# Patient Record
Sex: FEMALE | Race: WHITE | Employment: FULL TIME | URBAN - METROPOLITAN AREA
[De-identification: names, ages, dates, MRNs, and addresses within clinical notes are randomized per-mention and may not be internally consistent; named-entity substitution may affect disease eponyms.]

---

## 2018-01-15 ENCOUNTER — OFFICE VISIT (OUTPATIENT)
Dept: SURGERY | Age: 38
End: 2018-01-15

## 2018-01-15 VITALS
HEART RATE: 98 BPM | OXYGEN SATURATION: 100 % | SYSTOLIC BLOOD PRESSURE: 139 MMHG | HEIGHT: 67 IN | DIASTOLIC BLOOD PRESSURE: 95 MMHG | TEMPERATURE: 98.7 F | BODY MASS INDEX: 45.99 KG/M2 | RESPIRATION RATE: 20 BRPM | WEIGHT: 293 LBS

## 2018-01-15 DIAGNOSIS — R79.89 ELEVATED LFTS: Primary | ICD-10-CM

## 2018-01-15 DIAGNOSIS — K80.20 GALLSTONES: ICD-10-CM

## 2018-01-15 RX ORDER — PROMETHAZINE HYDROCHLORIDE 12.5 MG/1
12.5 TABLET ORAL
Qty: 20 TAB | Refills: 0 | Status: SHIPPED | OUTPATIENT
Start: 2018-01-15 | End: 2019-06-24

## 2018-01-15 NOTE — MR AVS SNAPSHOT
[address] [place] [address] 280, 5355 [facility], Suite [place] [address] [facility] 
[phone] Patient: Jessica Rollins MRN: [id number] GAP:[birth year] Visit Information Date & Time Provider Department Dept. Phone Encounter #  
 1/15/2018  2:20 PM Anita Lawson MD [facility] [place] [address] [id number] Your Appointments 2/2/2018  1:40 PM  
POST OP with Anita Lawson MD  
[facility] Dr. Jerardo Pierce [facility] ([place], [place]) ([address] [facility]) Appt Note: po- lap tesha 1/18/18  
 [address] [facility], [address] [place] [address], [address] [address]  
[address] [place] [address], [address] [place] [address], [address] [place] [address] [address] [phone] Upcoming Health Maintenance Date Due DTaP/Tdap/Td series (1 - Tdap) 3/11/1999 PAP AKA CERVICAL CYTOLOGY 3/11/1999 Influenza Age 5 to Adult 8/1/2017 Allergies as of 1/15/2018  Review Complete On: 1/15/2018 By: Anita Lawson MD  
  
 Severity Noted Reaction Type Reactions Biaxin [Clarithromycin]  05/31/2011    Nausea Only Cephalosporins  05/31/2011    Rash Levaquin [Levofloxacin]  11/05/2015    Rash Penicillin G  05/31/2011   Systemic Rash Current Immunizations  Never Reviewed No immunizations on file. Not reviewed this visit You Were Diagnosed With   
  
 Codes Comments Elevated LFTs    -  Primary ICD-10-CM: R79.89 ICD-9-CM: 790.6 Gallstones     ICD-10-CM: K80.20 ICD-9-CM: 574.20 Vitals BP Pulse Temp Resp Height(growth percentile) Weight(growth percentile) (!) 139/95 (BP 1 Location: Left arm, BP Patient Position: Sitting) 98 98.7 °F (37.1 °C) 20 5' 7\" (1.702 m) 299 lb 9.6 oz (135.9 kg) LMP SpO2 BMI OB Status Smoking Status 12/30/2017 100% 46.92 kg/m2 Needs review Former Smoker BMI and BSA Data Body Mass Index Body Surface Area  
 46.92 kg/m 2 2.53 m 2 Preferred Pharmacy Pharmacy Name Phone [place] [facility] [phone] [address] [facility], [address] AT [place] [address] [phone] Your Updated Medication List  
  
   
This list is accurate as of: 1/15/18 11:59 PM.  Always use your most recent med list.  
  
  
  
  
 CLARITIN 10 mg tablet Generic drug:  loratadine Take 5 mg by mouth daily. famotidine 20 mg tablet Commonly known as:  PEPCID Take 20 mg by mouth daily. FLUoxetine 40 mg capsule Commonly known as:  PROzac Take 60 mg by mouth nightly. ibuprofen 800 mg tablet Commonly known as:  MOTRIN Take 1 Tab by mouth every eight (8) hours. LORazepam 0.5 mg tablet Commonly known as:  ATIVAN Take 0.5 mg by mouth as needed. losartan 25 mg tablet Commonly known as:  COZAAR Take 50 mg by mouth nightly. promethazine 12.5 mg tablet Commonly known as:  PHENERGAN Take 1 Tab by mouth every six (6) hours as needed for Nausea. Prescriptions Sent to Pharmacy Refills  
 promethazine (PHENERGAN) 12.5 mg tablet 0 Sig: Take 1 Tab by mouth every six (6) hours as needed for Nausea. Class: Normal  
 Pharmacy: [facility] [address] [place] [address] [address] [facility] Ph #: [phone] Route: Oral  
  
We Performed the Following LIPASE N8452511 CPT(R)] METABOLIC PANEL, COMPREHENSIVE [58883 CPT(R)] To-Do List   
 01/15/2018 Imaging:  CT ABD PELV W CONT   
  
 01/16/2018 2:30 PM  
  Appointment with [address] [address] [facility] 2 at [facility] CT ([phone]) CONTRAST STUDY: 1. The patient should not eat solid food four hours before the appointment but should be encouraged to drink clear liquids. 2.  If you have to drink oral contrast, please pick it up any weekday prior to your appointment, if you cannot please check in 2 hrs before appt time. 3.  The patient will require IV access for contrast administration.  4. The patient should not take Ibuprofen (Advil, Motrin, etc.) and Naproxen Sodium (Aleve, etc.)  on the day of the exam. Stopping non-steroidal anti-inflammatory agents (NSAIDs) like Ibuprofen decreases the risk of kidney damage from the x-ray contrast (dye). 5.  [facility] films/images pertaining to the area of interest with you on the day of appointment. 6.  Bring current lab work if available (within last 90 days CMP) ***If scheduled at [facility], iSTAT is not available, labs will need to be done before appointment*** 7. Check in at registration at least 30 minutes before appt time unless you were instructed to do otherwise. Introducing [facility]! Juventino Thomas introduces [employer] patient portal. Now you can access parts of your medical record, email your doctor's office, and request medication refills online. 1. In your internet browser, go to [email or web address] 2. Click on the First Time User? Click Here link in the Sign In box. You will see the New Member Sign Up page. 3. Enter your [employer] Access Code exactly as it appears below. You will not need to use this code after youve completed the sign-up process. If you do not sign up before the expiration date, you must request a new code. · [employer] Access Code: [email or web address] Expires: 4/15/2018  2:25 PM 
 
4. Enter the last four digits of your Social Security Number (xxxx) and Date of Birth (mm/dd/yyyy) as indicated and click Submit. You will be taken to the next sign-up page. 5. Create a TextureMediat ID. This will be your [employer] login ID and cannot be changed, so think of one that is secure and easy to remember. 6. Create a [employer] password. You can change your password at any time. 7. Enter your Password Reset Question and Answer. This can be used at a later time if you forget your password. 8. Enter your e-mail address.  You will receive e-mail notification when new information is available in Neohapsis. 9. Click Sign Up. You can now view and download portions of your medical record. 10. Click the Download Summary menu link to download a portable copy of your medical information. If you have questions, please visit the Frequently Asked Questions section of the [employer] website. Remember, [employer] is NOT to be used for urgent needs. For medical emergencies, dial 911. Now available from your iPhone and Android! Please provide this summary of care documentation to your next provider. Your primary care clinician is listed as Adelbert Meckel. If you have any questions after today's visit, please call [phone].

## 2018-01-15 NOTE — PROGRESS NOTES
Surgery Consult:  Gallstone  Requesting physician:  [facility]    Subjective:   Patient 42 y.o.  female was seen at [facility] on 1/17/18 with 1 day history of intractable nausea, vomiting and achy igastric pain radiating to the RUQ and back. No F/C/S. No change in bowel habits. No diarrhea or constipation. Labs done at urgent care was remarkable for elevated LFTs. TB was 4.0, , , and . Patient subsequently underwent US on 1/9/18 which showed gallstone without ductal dilation. Patient does have history of biliary colic for few years. No history of pancreatitis, but  reports that his wife looks jaundice currently. Patient has been taking Excedrine almost daily for few months for headache. Patient reports that her N/V and abdominal pain is little better today. Of note, patient's mom is in hospice for most likely metastatic cholangiocarcinoma. Past Medical & Surgical History:  Past Medical History:   Diagnosis Date    Asthma     Heart abnormalities     PVCs      Past Surgical History:   Procedure Laterality Date    HX OTHER SURGICAL      tonsilectomy & adnoidectomy    HX OTHER SURGICAL      Vaginal delivery       Social History:  Social History     Social History    Marital status:      Spouse name: N/A    Number of children: N/A    Years of education: N/A     Occupational History    Not on file.      Social History Main Topics    Smoking status: Former Smoker    Smokeless tobacco: Never Used    Alcohol use No    Drug use: No    Sexual activity: Yes     Partners: Male     Birth control/ protection: Pill     Other Topics Concern    Not on file     Social History Narrative        Family History:  Family History   Problem Relation Age of Onset    Hypertension Mother     Hypertension Father     Cancer Father     Cancer Maternal Grandfather     Heart Disease Paternal Grandmother         Medications:  Current Outpatient Prescriptions   Medication Sig    promethazine (PHENERGAN) 12.5 mg tablet Take 1 Tab by mouth every six (6) hours as needed for Nausea.  FLUoxetine (PROZAC) 40 mg capsule Take 40 mg by mouth daily.  famotidine (PEPCID) 20 mg tablet Take 20 mg by mouth two (2) times a day.  losartan (COZAAR) 25 mg tablet Take 25 mg by mouth daily.  ibuprofen (MOTRIN) 800 mg tablet Take 1 Tab by mouth every eight (8) hours.  loratadine (CLARITIN) 10 mg tablet Take 5 mg by mouth daily. No current facility-administered medications for this visit. Allergies: Allergies   Allergen Reactions    Biaxin [Clarithromycin] Nausea Only    Cephalosporins Rash    Levaquin [Levofloxacin] Rash    Penicillin G Rash       Review of Systems  A comprehensive review of systems was negative except for that written in the HPI. Objective:     Exam:    Visit Vitals    BP (!) 139/95 (BP 1 Location: Left arm, BP Patient Position: Sitting)    Pulse 98    Temp 98.7 °F (37.1 °C)    Resp 20    Ht 5' 7\" (1.702 m)    Wt 135.9 kg (299 lb 9.6 oz)    SpO2 100%    BMI 46.92 kg/m2     General appearance: alert, cooperative, no distress, appears stated age  Eyes: sclera icterus  Lungs: clear to auscultation bilaterally  Heart: regular rate and rhythm  Abdomen: soft, non-distended. Mild epigastric and RUQ tenderness. No rebound or guarding. Extremities: extremities normal, atraumatic, no cyanosis or edema. JORGE. Skin: Skin color, texture, turgor normal. No rashes or lesions. Jaundice. Neurologic: Grossly normal      Assessment:     Gallstone  Elevated LFTs    Plan:     Repeat LFTs  Abd/pelvic CT scan to r/o any mass effect  Laparoscopic cholecystectomy/IOC. Risks, benefit, and alternative to surgery was discussed with the patient. The risks of surgery include but not limited to infection, bleeding, intraabdominal organ injury, bile duct injury, retained stone, bile leak, possible conversion to open, and the risks of general anesthetic.  Patient is agreeable to surgery. All questions answered.

## 2018-01-15 NOTE — PROGRESS NOTES
1. Have you been to the ER, urgent care clinic since your last visit? Hospitalized since your last visit? No    2. Have you seen or consulted any other health care providers outside of the [address] [place] [address] since your last visit? Include any pap smears or colon screening.  No

## 2018-01-15 NOTE — LETTER
NOTIFICATION OF RETURN TO WORK  
 
 
 
 
 
1/15/2018 3:56 PM 
 
Ms. Chiquita Cool [address] [address] To Whom It May Concern: 
 
 
Chiquita Paiz was under the care of [address] from 01/15/2018 to 02/05/2018. She will be able to return to work on 02/05/2018 with regular duties and/or activities . If there are questions or concerns please have the patient contact our office. Sincerely, Faina Acosta MD

## 2018-01-16 ENCOUNTER — HOSPITAL ENCOUNTER (OUTPATIENT)
Dept: CT IMAGING | Age: 38
Discharge: HOME | End: 2018-01-16
Attending: SURGERY
Payer: COMMERCIAL

## 2018-01-16 DIAGNOSIS — R79.89 ELEVATED LFTS: ICD-10-CM

## 2018-01-16 PROCEDURE — 74011636320 HC RX REV CODE- 636/320: Performed by: SURGERY

## 2018-01-16 PROCEDURE — 74177 CT ABD & PELVIS W/CONTRAST: CPT

## 2018-01-16 PROCEDURE — 74011250636 HC RX REV CODE- 250/636: Performed by: SURGERY

## 2018-01-16 PROCEDURE — 74178 CT ABD&PLV WO CNTR FLWD CNTR: CPT

## 2018-01-16 RX ORDER — SODIUM CHLORIDE 0.9 % (FLUSH) 0.9 %
10 SYRINGE (ML) INJECTION
Status: COMPLETED | OUTPATIENT
Start: 2018-01-16 | End: 2018-01-16

## 2018-01-16 RX ORDER — SODIUM CHLORIDE 9 MG/ML
50 INJECTION, SOLUTION INTRAVENOUS
Status: COMPLETED | OUTPATIENT
Start: 2018-01-16 | End: 2018-01-16

## 2018-01-16 RX ADMIN — IOPAMIDOL 100 ML: 755 INJECTION, SOLUTION INTRAVENOUS at 15:51

## 2018-01-16 RX ADMIN — SODIUM CHLORIDE 50 ML/HR: 900 INJECTION, SOLUTION INTRAVENOUS at 15:51

## 2018-01-16 RX ADMIN — Medication 10 ML: at 15:51

## 2018-01-17 ENCOUNTER — TELEPHONE (OUTPATIENT)
Dept: SURGERY | Age: 38
End: 2018-01-17

## 2018-01-17 ENCOUNTER — HOSPITAL ENCOUNTER (INPATIENT)
Age: 38
LOS: 6 days | Discharge: HOME HEALTH | DRG: 412 | End: 2018-01-23
Attending: SURGERY | Admitting: SURGERY
Payer: COMMERCIAL

## 2018-01-17 DIAGNOSIS — K80.50 COMMON BILE DUCT STONE: ICD-10-CM

## 2018-01-17 DIAGNOSIS — R17 JAUNDICE: Primary | ICD-10-CM

## 2018-01-17 LAB
ALBUMIN SERPL-MCNC: 2.3 G/DL (ref 3.5–5)
ALBUMIN SERPL-MCNC: 3.2 G/DL (ref 3.5–5.5)
ALBUMIN/GLOB SERPL: 0.5 {RATIO} (ref 1.1–2.2)
ALBUMIN/GLOB SERPL: 1 {RATIO} (ref 1.2–2.2)
ALP SERPL-CCNC: 306 IU/L (ref 39–117)
ALP SERPL-CCNC: 328 U/L (ref 45–117)
ALT SERPL-CCNC: 102 U/L (ref 12–78)
ALT SERPL-CCNC: 104 IU/L (ref 0–32)
ANION GAP SERPL CALC-SCNC: 9 MMOL/L (ref 5–15)
APTT PPP: 30.3 SEC (ref 22.1–32.5)
AST SERPL-CCNC: 82 IU/L (ref 0–40)
AST SERPL-CCNC: 90 U/L (ref 15–37)
ATRIAL RATE: 78 BPM
BASOPHILS # BLD: 0 K/UL (ref 0–0.1)
BASOPHILS NFR BLD: 0 % (ref 0–1)
BILIRUB DIRECT SERPL-MCNC: 7.2 MG/DL (ref 0–0.2)
BILIRUB SERPL-MCNC: 7.9 MG/DL (ref 0–1.2)
BILIRUB SERPL-MCNC: 9.1 MG/DL (ref 0.2–1)
BUN SERPL-MCNC: 6 MG/DL (ref 6–20)
BUN SERPL-MCNC: 7 MG/DL (ref 6–20)
BUN/CREAT SERPL: 13 (ref 9–23)
BUN/CREAT SERPL: 15 (ref 12–20)
CALCIUM SERPL-MCNC: 8.5 MG/DL (ref 8.5–10.1)
CALCIUM SERPL-MCNC: 8.8 MG/DL (ref 8.7–10.2)
CALCULATED P AXIS, ECG09: 17 DEGREES
CALCULATED R AXIS, ECG10: 7 DEGREES
CALCULATED T AXIS, ECG11: 34 DEGREES
CHLORIDE SERPL-SCNC: 100 MMOL/L (ref 97–108)
CHLORIDE SERPL-SCNC: 95 MMOL/L (ref 96–106)
CO2 SERPL-SCNC: 27 MMOL/L (ref 21–32)
CO2 SERPL-SCNC: 28 MMOL/L (ref 18–29)
CREAT SERPL-MCNC: 0.47 MG/DL (ref 0.55–1.02)
CREAT SERPL-MCNC: 0.47 MG/DL (ref 0.57–1)
DIAGNOSIS, 93000: NORMAL
EOSINOPHIL # BLD: 0.1 K/UL (ref 0–0.4)
EOSINOPHIL NFR BLD: 1 % (ref 0–7)
ERYTHROCYTE [DISTWIDTH] IN BLOOD BY AUTOMATED COUNT: 16.5 % (ref 11.5–14.5)
GLOBULIN SER CALC-MCNC: 3.3 G/DL (ref 1.5–4.5)
GLOBULIN SER CALC-MCNC: 4.3 G/DL (ref 2–4)
GLUCOSE SERPL-MCNC: 108 MG/DL (ref 65–100)
GLUCOSE SERPL-MCNC: 94 MG/DL (ref 65–99)
HCT VFR BLD AUTO: 30.9 % (ref 35–47)
HGB BLD-MCNC: 10 G/DL (ref 11.5–16)
INR PPP: 1.2 (ref 0.9–1.1)
LIPASE SERPL-CCNC: 11 U/L (ref 14–72)
LYMPHOCYTES # BLD: 0.9 K/UL (ref 0.8–3.5)
LYMPHOCYTES NFR BLD: 9 % (ref 12–49)
MCH RBC QN AUTO: 24.9 PG (ref 26–34)
MCHC RBC AUTO-ENTMCNC: 32.4 G/DL (ref 30–36.5)
MCV RBC AUTO: 77.1 FL (ref 80–99)
MONOCYTES # BLD: 1 K/UL (ref 0–1)
MONOCYTES NFR BLD: 10 % (ref 5–13)
NEUTS SEG # BLD: 8.9 K/UL (ref 1.8–8)
NEUTS SEG NFR BLD: 80 % (ref 32–75)
P-R INTERVAL, ECG05: 192 MS
PLATELET # BLD AUTO: 379 K/UL (ref 150–400)
POTASSIUM SERPL-SCNC: 3.1 MMOL/L (ref 3.5–5.1)
POTASSIUM SERPL-SCNC: 3.8 MMOL/L (ref 3.5–5.2)
PROT SERPL-MCNC: 6.5 G/DL (ref 6–8.5)
PROT SERPL-MCNC: 6.6 G/DL (ref 6.4–8.2)
PROTHROMBIN TIME: 11.9 SEC (ref 9–11.1)
Q-T INTERVAL, ECG07: 390 MS
QRS DURATION, ECG06: 92 MS
QTC CALCULATION (BEZET), ECG08: 444 MS
RBC # BLD AUTO: 4.01 M/UL (ref 3.8–5.2)
SODIUM SERPL-SCNC: 136 MMOL/L (ref 136–145)
SODIUM SERPL-SCNC: 138 MMOL/L (ref 134–144)
THERAPEUTIC RANGE,PTTT: NORMAL SECS (ref 58–77)
VENTRICULAR RATE, ECG03: 78 BPM
WBC # BLD AUTO: 10.9 K/UL (ref 3.6–11)

## 2018-01-17 PROCEDURE — 85730 THROMBOPLASTIN TIME PARTIAL: CPT | Performed by: SPECIALIST

## 2018-01-17 PROCEDURE — 85025 COMPLETE CBC W/AUTO DIFF WBC: CPT | Performed by: SPECIALIST

## 2018-01-17 PROCEDURE — 65270000015 HC RM PRIVATE ONCOLOGY

## 2018-01-17 PROCEDURE — 80076 HEPATIC FUNCTION PANEL: CPT | Performed by: SPECIALIST

## 2018-01-17 PROCEDURE — 80048 BASIC METABOLIC PNL TOTAL CA: CPT | Performed by: SPECIALIST

## 2018-01-17 PROCEDURE — 85610 PROTHROMBIN TIME: CPT | Performed by: SPECIALIST

## 2018-01-17 PROCEDURE — 74011250636 HC RX REV CODE- 250/636: Performed by: SURGERY

## 2018-01-17 PROCEDURE — 36415 COLL VENOUS BLD VENIPUNCTURE: CPT | Performed by: SPECIALIST

## 2018-01-17 PROCEDURE — 93005 ELECTROCARDIOGRAM TRACING: CPT

## 2018-01-17 PROCEDURE — 74011250637 HC RX REV CODE- 250/637: Performed by: SURGERY

## 2018-01-17 RX ORDER — CLINDAMYCIN PHOSPHATE 900 MG/50ML
900 INJECTION INTRAVENOUS
Status: DISCONTINUED | OUTPATIENT
Start: 2018-01-17 | End: 2018-01-17

## 2018-01-17 RX ORDER — SODIUM CHLORIDE, SODIUM LACTATE, POTASSIUM CHLORIDE, CALCIUM CHLORIDE 600; 310; 30; 20 MG/100ML; MG/100ML; MG/100ML; MG/100ML
25 INJECTION, SOLUTION INTRAVENOUS CONTINUOUS
Status: DISCONTINUED | OUTPATIENT
Start: 2018-01-18 | End: 2018-01-21

## 2018-01-17 RX ORDER — CLINDAMYCIN PHOSPHATE 600 MG/50ML
600 INJECTION INTRAVENOUS EVERY 8 HOURS
Status: DISCONTINUED | OUTPATIENT
Start: 2018-01-17 | End: 2018-01-19

## 2018-01-17 RX ORDER — HYDROMORPHONE HYDROCHLORIDE 1 MG/ML
0.5 INJECTION, SOLUTION INTRAMUSCULAR; INTRAVENOUS; SUBCUTANEOUS
Status: DISCONTINUED | OUTPATIENT
Start: 2018-01-17 | End: 2018-01-17

## 2018-01-17 RX ORDER — DEXTROSE, SODIUM CHLORIDE, AND POTASSIUM CHLORIDE 5; .45; .15 G/100ML; G/100ML; G/100ML
25 INJECTION INTRAVENOUS CONTINUOUS
Status: DISCONTINUED | OUTPATIENT
Start: 2018-01-17 | End: 2018-01-23 | Stop reason: HOSPADM

## 2018-01-17 RX ORDER — CLINDAMYCIN PHOSPHATE 600 MG/50ML
600 INJECTION INTRAVENOUS EVERY 8 HOURS
Status: DISCONTINUED | OUTPATIENT
Start: 2018-01-17 | End: 2018-01-17

## 2018-01-17 RX ORDER — SODIUM CHLORIDE 0.9 % (FLUSH) 0.9 %
5-10 SYRINGE (ML) INJECTION AS NEEDED
Status: DISCONTINUED | OUTPATIENT
Start: 2018-01-17 | End: 2018-01-23 | Stop reason: HOSPADM

## 2018-01-17 RX ORDER — SODIUM CHLORIDE, SODIUM LACTATE, POTASSIUM CHLORIDE, CALCIUM CHLORIDE 600; 310; 30; 20 MG/100ML; MG/100ML; MG/100ML; MG/100ML
25 INJECTION, SOLUTION INTRAVENOUS CONTINUOUS
Status: DISCONTINUED | OUTPATIENT
Start: 2018-01-18 | End: 2018-01-18 | Stop reason: HOSPADM

## 2018-01-17 RX ORDER — SODIUM CHLORIDE 0.9 % (FLUSH) 0.9 %
5-10 SYRINGE (ML) INJECTION EVERY 8 HOURS
Status: DISCONTINUED | OUTPATIENT
Start: 2018-01-17 | End: 2018-01-23 | Stop reason: HOSPADM

## 2018-01-17 RX ORDER — CLINDAMYCIN PHOSPHATE 600 MG/50ML
600 INJECTION INTRAVENOUS EVERY 8 HOURS
Status: DISCONTINUED | OUTPATIENT
Start: 2018-01-17 | End: 2018-01-17 | Stop reason: CLARIF

## 2018-01-17 RX ORDER — ONDANSETRON 2 MG/ML
4 INJECTION INTRAMUSCULAR; INTRAVENOUS
Status: DISCONTINUED | OUTPATIENT
Start: 2018-01-17 | End: 2018-01-23 | Stop reason: HOSPADM

## 2018-01-17 RX ORDER — HYDROMORPHONE HYDROCHLORIDE 2 MG/ML
0.5 INJECTION, SOLUTION INTRAMUSCULAR; INTRAVENOUS; SUBCUTANEOUS
Status: DISCONTINUED | OUTPATIENT
Start: 2018-01-17 | End: 2018-01-18

## 2018-01-17 RX ORDER — LOSARTAN POTASSIUM 50 MG/1
50 TABLET ORAL DAILY
Status: DISCONTINUED | OUTPATIENT
Start: 2018-01-17 | End: 2018-01-23 | Stop reason: HOSPADM

## 2018-01-17 RX ORDER — FLUOXETINE HYDROCHLORIDE 20 MG/1
60 CAPSULE ORAL DAILY
Status: DISCONTINUED | OUTPATIENT
Start: 2018-01-17 | End: 2018-01-23 | Stop reason: HOSPADM

## 2018-01-17 RX ADMIN — FLUOXETINE 60 MG: 20 CAPSULE ORAL at 21:35

## 2018-01-17 RX ADMIN — Medication 10 ML: at 21:36

## 2018-01-17 RX ADMIN — DEXTROSE MONOHYDRATE, SODIUM CHLORIDE, AND POTASSIUM CHLORIDE 125 ML/HR: 50; 4.5; 1.49 INJECTION, SOLUTION INTRAVENOUS at 19:11

## 2018-01-17 RX ADMIN — ONDANSETRON HYDROCHLORIDE 4 MG: 2 INJECTION, SOLUTION INTRAMUSCULAR; INTRAVENOUS at 22:14

## 2018-01-17 RX ADMIN — DEXTROSE MONOHYDRATE, SODIUM CHLORIDE, AND POTASSIUM CHLORIDE 125 ML/HR: 50; 4.5; 1.49 INJECTION, SOLUTION INTRAVENOUS at 11:27

## 2018-01-17 RX ADMIN — CLINDAMYCIN PHOSPHATE 600 MG: 12 INJECTION, SOLUTION INTRAVENOUS at 13:29

## 2018-01-17 RX ADMIN — CLINDAMYCIN PHOSPHATE 600 MG: 12 INJECTION, SOLUTION INTRAVENOUS at 21:35

## 2018-01-17 RX ADMIN — HYDROMORPHONE HYDROCHLORIDE 0.5 MG: 2 INJECTION INTRAMUSCULAR; INTRAVENOUS; SUBCUTANEOUS at 13:29

## 2018-01-17 RX ADMIN — LOSARTAN POTASSIUM 50 MG: 50 TABLET, FILM COATED ORAL at 22:18

## 2018-01-17 NOTE — IP AVS SNAPSHOT
[address] [facility] 
[phone] Patient: Elena Galeana MRN: [id number] YXR:0/84/7574 About your hospitalization You were admitted on:  January 17, 2018 You last received care in the:  [facility] 2 GENERAL SURGERY You were discharged on:  January 23, 2018 Why you were hospitalized Your primary diagnosis was:  Not on File Your diagnoses also included:  Jaundice, Common Bile Duct Stone Follow-up Information Follow up With Details Comments Contact Info Georgie Fam MD   [address] [place] [address] [address] [phone] Your Scheduled Appointments Friday February 02, 2018  1:40 PM EST  
POST OP with Sarah Landa MD  
[facility] Dr[address] Jerardo Pierce [facility] ([address]) [address] [place] [address], [address] [facility]  
[phone] Discharge Orders None A check nancy indicates which time of day the medication should be taken. My Medications START taking these medications Instructions Each Dose to Equal  
 Morning Noon Evening Bedtime  
 traMADol 50 mg tablet Commonly known as:  ULTRAM  
   
Your last dose was: Your next dose is: Take 1-2 Tabs by mouth every six (6) hours as needed for Pain. Max Daily Amount: 400 mg.  
  mg CONTINUE taking these medications Instructions Each Dose to Equal  
 Morning Noon Evening Bedtime CLARITIN 10 mg tablet Generic drug:  loratadine Your last dose was: Your next dose is: Take 5 mg by mouth daily. 5 mg  
    
   
   
   
  
 famotidine 20 mg tablet Commonly known as:  PEPCID Your last dose was: Your next dose is: Take 20 mg by mouth daily. 20 mg FLUoxetine 40 mg capsule Commonly known as:  PROzac Your last dose was: Your next dose is: Take 60 mg by mouth nightly. 60 mg  
    
   
   
   
  
 ibuprofen 800 mg tablet Commonly known as:  MOTRIN Your last dose was: Your next dose is: Take 1 Tab by mouth every eight (8) hours. 800 mg LORazepam 0.5 mg tablet Commonly known as:  ATIVAN Your last dose was: Your next dose is: Take 0.5 mg by mouth as needed. 0.5 mg  
    
   
   
   
  
 losartan 25 mg tablet Commonly known as:  COZAAR Your last dose was: Your next dose is: Take 50 mg by mouth nightly. 50 mg  
    
   
   
   
  
 promethazine 12.5 mg tablet Commonly known as:  PHENERGAN Your last dose was: Your next dose is: Take 1 Tab by mouth every six (6) hours as needed for Nausea. 12.5 mg Where to Get Your Medications Information on where to get these meds will be given to you by the nurse or doctor. ! Ask your nurse or doctor about these medications  
  traMADol 50 mg tablet Discharge Instructions Patient Discharge Instructions Alcides Hazel / [id number] : 1980 Admitted 2018 Discharged: [facility] Recommended diet: Low fat, Low cholesterol Recommended activity: no heavy lifting > 20 lbs x 6 weeks; no driving while taking narcotics for pain. May take shower, but no bath x 2 weeks. Empty and keep record of T-tube and BRANDI output daily. If you experience a lot of drainage, develop redness around the wound, or a fever over 101 F occurs please call the office. Narcotics and anesthesia sometimes cause nausea and vomiting. If persistent please call the office. Do not hesitate to call with questions or concerns. Follow-up with Dr. Julia Aguilera in 1 week. Please call [phone] for an appointment.  
 
 
 
Information obtained by : 
 I understand that if any problems occur once I am at home I am to contact my physician. I understand and acknowledge receipt of the instructions indicated above. Physician's or R.N.'s Signature                                                                  Date/Time Patient or Representative Signature                                                          Date/Time Introducing [facility]! Dimitris Garibay introduces "[employer] patient portal. Now you can access parts of your medical record, email your doctor's office, and request medication refills online. 1. In your internet browser, go to [email or web address] 2. Click on the First Time User? Click Here link in the Sign In box. You will see the New Member Sign Up page. 3. Enter your "[employer] Access Code exactly as it appears below. You will not need to use this code after youve completed the sign-up process. If you do not sign up before the expiration date, you must request a new code. · "[employer] Access Code: [email or web address] Expires: 4/15/2018  2:25 PM 
 
4. Enter the last four digits of your Social Security Number (xxxx) and Date of Birth (mm/dd/yyyy) as indicated and click Submit. You will be taken to the next sign-up page. 5. Create a "[employer] ID. This will be your "[employer] login ID and cannot be changed, so think of one that is secure and easy to remember. 6. Create a "[employer] password. You can change your password at any time. 7. Enter your Password Reset Question and Answer. This can be used at a later time if you forget your password. 8. Enter your e-mail address.  You will receive e-mail notification when new information is available in en-Gauge. 9. Click Sign Up. You can now view and download portions of your medical record. 10. Click the Download Summary menu link to download a portable copy of your medical information. If you have questions, please visit the Frequently Asked Questions section of the [employer] website. Remember, [employer] is NOT to be used for urgent needs. For medical emergencies, dial 911. Now available from your iPhone and Android! Providers Seen During Your Hospitalization Provider Specialty Primary office phone Estelle Hoang MD General Surgery [phone] Your Primary Care Physician (PCP) Primary Care Physician Office Phone Office Fax Brody Puma, [address] [phone] You are allergic to the following Allergen Reactions Biaxin (Clarithromycin) Nausea Only Cephalosporins Rash Dilaudid (Hydromorphone) Shortness of Breath Extremely lethargic, Flushed Levaquin (Levofloxacin) Rash Penicillin G Rash Recent Documentation Height Weight Breastfeeding? BMI OB Status Smoking Status 1.702 m 135.6 kg No 46.83 kg/m2 Having regular periods Former Smoker Emergency Contacts Name Discharge Info Relation Home Work Mobile Italo Toussaint DISCHARGE CAREGIVER [3] Spouse [3]   [phone] Patient Belongings The following personal items are in your possession at time of discharge: 
  Dental Appliances: None  Visual Aid: None   Hearing Aids/Status: Does not own  Home Medications: None   Jewelry: None  Clothing: None    Other Valuables: None  Personal Items Sent to Safe: n/a Please provide this summary of care documentation to your next provider. Signatures-by signing, you are acknowledging that this After Visit Summary has been reviewed with you and you have received a copy.   
  
 
  
    
    
 Patient Signature: ____________________________________________________________ Date:  ____________________________________________________________  
  
Sharlyne Fernandez Provider Signature:  ____________________________________________________________ Date:  ____________________________________________________________

## 2018-01-17 NOTE — TELEPHONE ENCOUNTER
Spoke with Ms Quinten Cruz. Pt to be admitted today, she should arrive at [address] [place] [address] Dr [address], outpatient registration.

## 2018-01-17 NOTE — H&P
Surgery Consult:  Gallstone  Requesting physician:  [facility]     Subjective:   Patient 42 y.o.  female was seen at [facility] on 1/17/18 with 1 day history of intractable nausea, vomiting and achy igastric pain radiating to the RUQ and back. No F/C/S. No change in bowel habits. No diarrhea or constipation. Labs done at urgent care was remarkable for elevated LFTs. TB was 4.0, , , and . Patient subsequently underwent US on 1/9/18 which showed gallstone without ductal dilation. Patient does have history of biliary colic for few years. No history of pancreatitis, but  reports that his wife looks jaundice currently. Patient has been taking Excedrine almost daily for few months for headache. Patient reports that her N/V and abdominal pain is little better today. Of note, patient's mom is in hospice for most likely metastatic cholangiocarcinoma. Patient was seen in my clinic on 1/15/17 and ordered f/u LFTs and CT scan. LFTs showed TB 7.9, ASLP 306.   CT scan showed cholelithiasis with CBD stone and mild intrahepatic biliary dilation; enlarged celiac axis node.          Past Medical & Surgical History:       Past Medical History:   Diagnosis Date    Asthma      Heart abnormalities       PVCs            Past Surgical History:   Procedure Laterality Date    HX OTHER SURGICAL         tonsilectomy & adnoidectomy    HX OTHER SURGICAL         Vaginal delivery         Social History:  Social History            Social History    Marital status:        Spouse name: N/A    Number of children: N/A    Years of education: N/A          Occupational History    Not on file.            Social History Main Topics    Smoking status: Former Smoker    Smokeless tobacco: Never Used    Alcohol use No    Drug use: No    Sexual activity: Yes       Partners: Male       Birth control/ protection: Pill           Other Topics Concern    Not on file      Social History Narrative    Family History:        Family History   Problem Relation Age of Onset    Hypertension Mother      Hypertension Father      Cancer Father      Cancer Maternal Grandfather      Heart Disease Paternal Grandmother           Medications:       Current Outpatient Prescriptions   Medication Sig    promethazine (PHENERGAN) 12.5 mg tablet Take 1 Tab by mouth every six (6) hours as needed for Nausea.  FLUoxetine (PROZAC) 40 mg capsule Take 40 mg by mouth daily.  famotidine (PEPCID) 20 mg tablet Take 20 mg by mouth two (2) times a day.  losartan (COZAAR) 25 mg tablet Take 25 mg by mouth daily.  ibuprofen (MOTRIN) 800 mg tablet Take 1 Tab by mouth every eight (8) hours.  loratadine (CLARITIN) 10 mg tablet Take 5 mg by mouth daily.      No current facility-administered medications for this visit.          Allergies: Allergies   Allergen Reactions    Biaxin [Clarithromycin] Nausea Only    Cephalosporins Rash    Levaquin [Levofloxacin] Rash    Penicillin G Rash         Review of Systems  A comprehensive review of systems was negative except for that written in the HPI.     Objective:      Exam:          Visit Vitals    BP (!) 139/95 (BP 1 Location: Left arm, BP Patient Position: Sitting)    Pulse 98    Temp 98.7 °F (37.1 °C)    Resp 20    Ht 5' 7\" (1.702 m)    Wt 135.9 kg (299 lb 9.6 oz)    SpO2 100%    BMI 46.92 kg/m2      General appearance: alert, cooperative, no distress, appears stated age  Eyes: sclera icterus  Lungs: clear to auscultation bilaterally  Heart: regular rate and rhythm  Abdomen: soft, non-distended. Mild epigastric and RUQ tenderness. No rebound or guarding. Extremities: extremities normal, atraumatic, no cyanosis or edema. JORGE. Skin: Skin color, texture, turgor normal. No rashes or lesions. Jaundice.   Neurologic: Grossly normal        Assessment:      Gallstone  CBD stone  Elevated LFTs     Plan:      Direct admit for worsening TB and concern for possible cholangitis. GI consult for ERCP  Plan for ERCP and laparoscopic cholecystectomy/IOC tomorrow. Risks, benefit, and alternative to surgery was discussed with the patient. The risks of surgery include but not limited to infection, bleeding, intraabdominal organ injury, bile duct injury, retained stone, bile leak, possible conversion to open, and the risks of general anesthetic. Patient is agreeable to surgery. All questions answered.   Antibiotics

## 2018-01-17 NOTE — PROGRESS NOTES
Pharmacy Automatic Renal Dosing Protocol - Antimicrobials    Indication for Antimicrobials: Cholelithiasis    Current Regimen of Each Antimicrobial:  Clindamycin 600 mg IV q8H (Start Date ; Day # 1)    Previous Antimicrobial Therapy:    Significant Cultures:    CT ABD: Cholelithiasis with common bile duct stone and mild intrahepatic biliary  dilatation  Enlarged celiac axis node, significance unclear. Additional shotty nodes as  described. Incidental descending colon diverticulosis. Paralysis, amputations, malnutrition: N/A    Labs:  Recent Labs      18   1223   CREA  0.47*   BUN  6     Temp (24hrs), Av °F (36.7 °C), Min:98 °F (36.7 °C), Max:98 °F (36.7 °C)      Creatinine Clearance (mL/min) or Dialysis: 104      Impression/Plan: Starting clindamycin 600 mg IV q8h is appropriate. No renal adjustment needed     Pharmacy will follow daily and adjust medications as appropriate for renal function and/or serum levels. Thank you,  Leslie Jaramillo          Recommended duration of therapy  [email or web address]/[facility]/Pharmacy/[email or web address]. docx    Renal Dosing  [email or web address]/[facility]/Pharmacy/Clinical%20Companion/Renal%[email or web address]. pdf

## 2018-01-17 NOTE — PROGRESS NOTES
Oncology Nursing Communication Tool  4:03 PM  1/17/2018     Bedside shift change report given to Jhon Dixon RN (incoming nurse) by Kelle Hunt RN (outgoing nurse[facility] Report included the following information SBAR. Shift Summary:       Issues for physician to address: Oncology Shift Note   Admission Date 1/17/2018   Admission Diagnosis Gallstone/IV ABX  Jaundice   Code Status Full Code   Consults IP CONSULT TO GASTROENTEROLOGY      Cardiac Monitoring [] Yes [x] No      Purposeful Hourly Rounding [x] Yes    Marcin Score Total Score: 1   Marcin score 3 or > [] Bed Alarm [] Avasys [] 1:1 sitter [] Patient refused (Place signed refusal form in chart)      Pain Managed [] Yes [] No    Key Pain Meds             ibuprofen (MOTRIN) 800 mg tablet Take 1 Tab by mouth every eight (8) hours. Influenza Vaccine Received Flu Vaccine for Current Season (usually Sept-March): Yes           Oxygen needs? [x] Room air Oxygen @  []1L    []2L    []3L   []4L    []5L   []6L     Use home O2? [] Yes [] No  Perform O2 challenge test using  smartphrase (.oxygenchallenge)      Last bowel movement    bowel movement      Urinary Catheter             LDAs               Peripheral IV 01/17/18 Right Antecubital (Active)   Site Assessment Clean, dry, & intact 1/17/2018 11:39 AM   Phlebitis Assessment 0 1/17/2018 11:39 AM   Infiltration Assessment 0 1/17/2018 11:39 AM   Dressing Status Clean, dry, & intact 1/17/2018 11:39 AM   Dressing Type Transparent 1/17/2018 11:39 AM   Hub Color/Line Status Blue; Infusing 1/17/2018 11:39 AM                         Readmission Risk Assessment Tool Score Low Risk            3       Total Score        3 Has Seen PCP in Last 6 Months (Yes=3, No=0)        Criteria that do not apply:    . Living with Significant Other. Assisted Living. LTAC. SNF. or   Rehab    Patient Length of Stay (>5 days = 3)    IP Visits Last 12 Months (1-3=4, 4=9, >4=11)    Pt.  Coverage (Medicare=5 , Medicaid, or Self-Pay=4)    Charlson Comorbidity Score (Age + Comorbid Conditions)       Expected Length of Stay - - -   Actual Length of Stay 0          Rut Bolanos, RN

## 2018-01-17 NOTE — CONSULTS
Gastroenterology Consultation Note  Dr. Dalila Handy    NAME: Aleena Loco : 1980 MRN: [id number]   PCP: Ever Modi MD  Date/Time:  2018 2:57 PM  Subjective:   REASON FOR CONSULT:      Aleena Loco is a 42 y.o.  female who I was asked to see for abdominal pain and n/v. She states that over the past year she has been having recurrent episodes of nausea and vomiting with sharp upper abdominal pains in the RUQ and epigastrim. Sxs last hours and then resolve completely. After her most recent episode she went to [facility] and was found to have elevated LFTs with bilirubin in the 4 range and was sent for an ultrasound. U/S showed multiple gallstones and was then referred to Dr. Sally Cat who planned on a Lap tesha but ordered a CT scan. There was a 1 cm stone seen in the common bile duct with resultant dilation of intrahepatic ducts. Her T bili was 7.9 yesterday. Past Medical History:   Diagnosis Date    Asthma     Heart abnormalities     PVCs    Hypertension       Past Surgical History:   Procedure Laterality Date    HX OTHER SURGICAL      tonsilectomy & adnoidectomy    HX OTHER SURGICAL      Vaginal delivery     Social History   Substance Use Topics    Smoking status: Former Smoker    Smokeless tobacco: Never Used    Alcohol use No      Family History   Problem Relation Age of Onset    Hypertension Mother     Hypertension Father     Cancer Father     Cancer Maternal Grandfather     Heart Disease Paternal Grandmother       Allergies   Allergen Reactions    Biaxin [Clarithromycin] Nausea Only    Cephalosporins Rash    Levaquin [Levofloxacin] Rash    Penicillin G Rash      Home Medications:  Prior to Admission Medications   Prescriptions Last Dose Informant Patient Reported? Taking? FLUoxetine (PROZAC) 40 mg capsule   Yes No   Sig: Take 60 mg by mouth nightly.    LORazepam (ATIVAN) 0.5 mg tablet Not Taking at Unknown time  Yes No   Sig: Take 0.5 mg by mouth as needed. famotidine (PEPCID) 20 mg tablet   Yes No   Sig: Take 20 mg by mouth daily. ibuprofen (MOTRIN) 800 mg tablet   No No   Sig: Take 1 Tab by mouth every eight (8) hours. loratadine (CLARITIN) 10 mg tablet Not Taking at Unknown time  Yes No   Sig: Take 5 mg by mouth daily. losartan (COZAAR) 25 mg tablet   Yes No   Sig: Take 50 mg by mouth nightly. promethazine (PHENERGAN) 12.5 mg tablet Not Taking at Unknown time  No No   Sig: Take 1 Tab by mouth every six (6) hours as needed for Nausea.       Facility-Administered Medications: None     Hospital medications:  Current Facility-Administered Medications   Medication Dose Route Frequency    [START ON 1/18/2018] lactated Ringers infusion  25 mL/hr IntraVENous CONTINUOUS    dextrose 5% - 0.45% NaCl with KCl 20 mEq/L infusion  125 mL/hr IntraVENous CONTINUOUS    ondansetron (ZOFRAN) injection 4 mg  4 mg IntraVENous Q4H PRN    sodium chloride (NS) flush 5-10 mL  5-10 mL IntraVENous Q8H    sodium chloride (NS) flush 5-10 mL  5-10 mL IntraVENous PRN    HYDROmorphone (DILAUDID) injection 0.5 mg  0.5 mg IntraVENous Q4H PRN    [START ON 1/18/2018] lactated Ringers infusion  25 mL/hr IntraVENous CONTINUOUS    clindamycin (CLEOCIN) 600mg D5W 50mL IVPB (premix)  600 mg IntraVENous Q8H     REVIEW OF SYSTEMS:      Review of Systems -   History obtained from chart review and the patient  General ROS: positive for  - chills, fatigue and fever  Psychological ROS: negative  Hematological and Lymphatic ROS: negative for - bleeding problems or blood transfusions  Respiratory ROS: no cough, shortness of breath, or wheezing  Cardiovascular ROS: no chest pain or dyspnea on exertion  Gastrointestinal ROS: see HPI  Genito-Urinary ROS: positive for - dark urine  Musculoskeletal ROS: negative  Neurological ROS: no TIA or stroke symptoms  Dermatological ROS: negative for - pruritus or rash    Objective:   VITALS:    Visit Vitals    /76 (BP 1 Location: Right arm, BP Patient Position: At rest;Post activity)    Pulse 78    Temp 98 °F (36.7 °C)    Resp 16    LMP 2017    SpO2 98%    Breastfeeding No     Temp (24hrs), Av °F (36.7 °C), Min:98 °F (36.7 °C), Max:98 °F (36.7 °C)    PHYSICAL EXAM:   General:    Alert, cooperative, no distress, appears stated age. Head:   Normocephalic, without obvious abnormality, atraumatic. Eyes:   Conjunctivae clear, anicteric sclerae. Pupils are equal  Nose:  Nares normal. No drainage or sinus tenderness. Throat:    Lips, mucosa, and tongue normal.  No Thrush  Neck:  Supple, symmetrical,  no adenopathy, thyroid: non tender  Back:    Symmetric,  No CVA tenderness. Lungs:   CTA bilaterally. No wheezing/rhonchi/rales. Heart:   Regular rate and rhythm,  no murmur, rub or gallop. Abdomen:   Soft, obese, tender in RUQ without voluntary guarding or rebound  Extremities: No cyanosis. No edema. No clubbing  Skin:     Texture, turgor normal. No rashes/lesions/jaundice  Lymph: Cervical, supraclavicular normal.  Psych:  Good insight. Not depressed. Not anxious or agitated. Neurologic: EOMs intact. No facial asymmetry. No aphasia or slurred speech normal strength, A/O X 3. LAB DATA REVIEWED:    Lab Results   Component Value Date/Time    WBC 10.6 2011 06:45 AM    HGB 12.2 2011 06:45 AM    HCT 35.9 2011 06:45 AM    PLATELET 842  06:45 AM    MCV 81.8 2011 06:45 AM     Lab Results   Component Value Date/Time    ALT (SGPT) 104 2018 12:23 PM    AST (SGOT) 82 2018 12:23 PM    Alk.  phosphatase 306 2018 12:23 PM    Bilirubin, total 7.9 2018 12:23 PM     Lab Results   Component Value Date/Time    Sodium 138 2018 12:23 PM    Potassium 3.8 2018 12:23 PM    Chloride 95 2018 12:23 PM    CO2 28 2018 12:23 PM    Glucose 94 2018 12:23 PM    BUN 6 2018 12:23 PM    Creatinine 0.47 2018 12:23 PM    BUN/Creatinine ratio 13 2018 12:23 PM    GFR est  01/16/2018 12:23 PM    GFR est non- 01/16/2018 12:23 PM    Calcium 8.8 01/16/2018 12:23 PM     Lab Results   Component Value Date/Time    Lipase 11 01/16/2018 12:23 PM     IMAGING RESULTS:   [x]      I have personally reviewed the actual   []    CXR  [x]    CT  []     US    Impression:  Choledocholithiasis  Jaundice due to biliary obstruction     Plan:  Patient with T bili that went from 4 to 7.9 with CT evidence of choledocholithiasis and would therefore plan on ERCP with Lap Tesha. Given rise in LFTs and risk of developing cholangitis I agree with admission, IV antibiotics and keep NPO after midnight for ERCP with possible sphincterotomy and stone extraction tomorrow timing to be coordinated with Lap tesha under general anesthesia in main OR. PRocedure including risks, benefits and alternatives presented to pt and  and they agree with plan.   Thank you for this consult.       ___________________________________________________  Care Plan discussed with:    [x]    Patient   [x]    Family   [x]    Nursing   [x]    Attending   ___________________________________________________  GI: Aggie Martinez MD

## 2018-01-18 ENCOUNTER — ANESTHESIA (OUTPATIENT)
Dept: SURGERY | Age: 38
DRG: 412 | End: 2018-01-18
Payer: COMMERCIAL

## 2018-01-18 ENCOUNTER — APPOINTMENT (OUTPATIENT)
Dept: GENERAL RADIOLOGY | Age: 38
DRG: 412 | End: 2018-01-18
Attending: SPECIALIST
Payer: COMMERCIAL

## 2018-01-18 ENCOUNTER — ANESTHESIA EVENT (OUTPATIENT)
Dept: SURGERY | Age: 38
DRG: 412 | End: 2018-01-18
Payer: COMMERCIAL

## 2018-01-18 ENCOUNTER — APPOINTMENT (OUTPATIENT)
Dept: GENERAL RADIOLOGY | Age: 38
DRG: 412 | End: 2018-01-18
Attending: SURGERY
Payer: COMMERCIAL

## 2018-01-18 PROBLEM — K80.50 COMMON BILE DUCT STONE: Status: ACTIVE | Noted: 2018-01-18

## 2018-01-18 LAB — HCG UR QL: NEGATIVE

## 2018-01-18 PROCEDURE — 74011250636 HC RX REV CODE- 250/636: Performed by: SURGERY

## 2018-01-18 PROCEDURE — 77030010104 HC SEAL PRT ENDOSC BYRN -B: Performed by: SURGERY

## 2018-01-18 PROCEDURE — 74300 X-RAY BILE DUCTS/PANCREAS: CPT

## 2018-01-18 PROCEDURE — C1769 GUIDE WIRE: HCPCS | Performed by: SURGERY

## 2018-01-18 PROCEDURE — 77030008467 HC STPLR SKN COVD -B: Performed by: SURGERY

## 2018-01-18 PROCEDURE — 77030018390 HC SPNG HEMSTAT2 J&J -B: Performed by: SURGERY

## 2018-01-18 PROCEDURE — 77030011244 HC DRN WND HUBLS J&J -B: Performed by: SURGERY

## 2018-01-18 PROCEDURE — C1726 CATH, BAL DIL, NON-VASCULAR: HCPCS | Performed by: SURGERY

## 2018-01-18 PROCEDURE — BF101ZZ FLUOROSCOPY OF BILE DUCTS USING LOW OSMOLAR CONTRAST: ICD-10-PCS | Performed by: SPECIALIST

## 2018-01-18 PROCEDURE — 74011250636 HC RX REV CODE- 250/636

## 2018-01-18 PROCEDURE — 77030031139 HC SUT VCRL2 J&J -A: Performed by: SURGERY

## 2018-01-18 PROCEDURE — 77030020263 HC SOL INJ SOD CL0.9% LFCR 1000ML: Performed by: SURGERY

## 2018-01-18 PROCEDURE — 77030012022 HC APPL CLP ENDOSC COVD -C: Performed by: SURGERY

## 2018-01-18 PROCEDURE — 77030035051: Performed by: SURGERY

## 2018-01-18 PROCEDURE — 77030026438 HC STYL ET INTUB CARD -A: Performed by: ANESTHESIOLOGY

## 2018-01-18 PROCEDURE — BF101ZZ FLUOROSCOPY OF BILE DUCTS USING LOW OSMOLAR CONTRAST: ICD-10-PCS | Performed by: SURGERY

## 2018-01-18 PROCEDURE — 0FC98ZZ EXTIRPATION OF MATTER FROM COMMON BILE DUCT, VIA NATURAL OR ARTIFICIAL OPENING ENDOSCOPIC: ICD-10-PCS | Performed by: SPECIALIST

## 2018-01-18 PROCEDURE — 76060000043 HC ANESTHESIA 6 TO 6.5 HR: Performed by: SURGERY

## 2018-01-18 PROCEDURE — 77030029301 HC PMP F NGP WND DRSG PICO S&N -F: Performed by: SURGERY

## 2018-01-18 PROCEDURE — C1758 CATHETER, URETERAL: HCPCS | Performed by: SURGERY

## 2018-01-18 PROCEDURE — 77030011640 HC PAD GRND REM COVD -A: Performed by: SURGERY

## 2018-01-18 PROCEDURE — 77030008771 HC TU NG SALEM SUMP -A: Performed by: ANESTHESIOLOGY

## 2018-01-18 PROCEDURE — 74330 X-RAY BILE/PANC ENDOSCOPY: CPT

## 2018-01-18 PROCEDURE — 77030006565 HC BG DRN BILE BARD -A: Performed by: SURGERY

## 2018-01-18 PROCEDURE — 77030009038 HC CATH BILI STN RTVR BSC -C: Performed by: SURGERY

## 2018-01-18 PROCEDURE — 88304 TISSUE EXAM BY PATHOLOGIST: CPT | Performed by: SURGERY

## 2018-01-18 PROCEDURE — 77030020053 HC ELECTRD LAPSCP COVD -B: Performed by: SURGERY

## 2018-01-18 PROCEDURE — 74011000250 HC RX REV CODE- 250

## 2018-01-18 PROCEDURE — 77030008684 HC TU ET CUF COVD -B: Performed by: ANESTHESIOLOGY

## 2018-01-18 PROCEDURE — 65270000029 HC RM PRIVATE

## 2018-01-18 PROCEDURE — 81025 URINE PREGNANCY TEST: CPT

## 2018-01-18 PROCEDURE — 76010000140 HC OR TIME 6 TO 6.5 HR: Performed by: SURGERY

## 2018-01-18 PROCEDURE — 77030032490 HC SLV COMPR SCD KNE COVD -B: Performed by: SURGERY

## 2018-01-18 PROCEDURE — 77030002895 HC DEV VASC CLOSR COVD -B: Performed by: SURGERY

## 2018-01-18 PROCEDURE — 74011250636 HC RX REV CODE- 250/636: Performed by: ANESTHESIOLOGY

## 2018-01-18 PROCEDURE — 0FT40ZZ RESECTION OF GALLBLADDER, OPEN APPROACH: ICD-10-PCS | Performed by: SURGERY

## 2018-01-18 PROCEDURE — 77030008756 HC TU IRR SUC STRY -B: Performed by: SURGERY

## 2018-01-18 PROCEDURE — 77030020256 HC SOL INJ NACL 0.9%  500ML: Performed by: SURGERY

## 2018-01-18 PROCEDURE — 77030008623: Performed by: SURGERY

## 2018-01-18 PROCEDURE — 77030035045 HC TRCR ENDOSC VRSPRT BLDLSS COVD -B: Performed by: SURGERY

## 2018-01-18 PROCEDURE — 77030018846 HC SOL IRR STRL H20 ICUM -A: Performed by: SURGERY

## 2018-01-18 PROCEDURE — 77030002967 HC SUT PDS J&J -B: Performed by: SURGERY

## 2018-01-18 PROCEDURE — 76210000006 HC OR PH I REC 0.5 TO 1 HR: Performed by: SURGERY

## 2018-01-18 PROCEDURE — 77030010507 HC ADH SKN DERMBND J&J -B: Performed by: SURGERY

## 2018-01-18 PROCEDURE — 0FC90ZZ EXTIRPATION OF MATTER FROM COMMON BILE DUCT, OPEN APPROACH: ICD-10-PCS | Performed by: SURGERY

## 2018-01-18 PROCEDURE — 77030010803: Performed by: SURGERY

## 2018-01-18 PROCEDURE — 77030035048 HC TRCR ENDOSC OPTCL COVD -B: Performed by: SURGERY

## 2018-01-18 PROCEDURE — 74011636320 HC RX REV CODE- 636/320: Performed by: SPECIALIST

## 2018-01-18 PROCEDURE — 0FJ44ZZ INSPECTION OF GALLBLADDER, PERCUTANEOUS ENDOSCOPIC APPROACH: ICD-10-PCS | Performed by: SURGERY

## 2018-01-18 PROCEDURE — 77030020782 HC GWN BAIR PAWS FLX 3M -B

## 2018-01-18 PROCEDURE — 77030002966 HC SUT PDS J&J -A: Performed by: SURGERY

## 2018-01-18 PROCEDURE — 77030037892: Performed by: SURGERY

## 2018-01-18 RX ORDER — ACETAMINOPHEN 10 MG/ML
INJECTION, SOLUTION INTRAVENOUS AS NEEDED
Status: DISCONTINUED | OUTPATIENT
Start: 2018-01-18 | End: 2018-01-18 | Stop reason: HOSPADM

## 2018-01-18 RX ORDER — SODIUM CHLORIDE 0.9 % (FLUSH) 0.9 %
5-10 SYRINGE (ML) INJECTION AS NEEDED
Status: DISCONTINUED | OUTPATIENT
Start: 2018-01-18 | End: 2018-01-18 | Stop reason: HOSPADM

## 2018-01-18 RX ORDER — DEXTROSE, SODIUM CHLORIDE, AND POTASSIUM CHLORIDE 5; .45; .15 G/100ML; G/100ML; G/100ML
INJECTION INTRAVENOUS
Status: COMPLETED
Start: 2018-01-18 | End: 2018-01-18

## 2018-01-18 RX ORDER — HYDROMORPHONE HYDROCHLORIDE 2 MG/ML
.2-.5 INJECTION, SOLUTION INTRAMUSCULAR; INTRAVENOUS; SUBCUTANEOUS
Status: DISCONTINUED | OUTPATIENT
Start: 2018-01-18 | End: 2018-01-18 | Stop reason: HOSPADM

## 2018-01-18 RX ORDER — MORPHINE SULFATE 10 MG/ML
2 INJECTION, SOLUTION INTRAMUSCULAR; INTRAVENOUS
Status: DISCONTINUED | OUTPATIENT
Start: 2018-01-18 | End: 2018-01-18 | Stop reason: HOSPADM

## 2018-01-18 RX ORDER — MORPHINE SULFATE 2 MG/ML
1-2 INJECTION, SOLUTION INTRAMUSCULAR; INTRAVENOUS
Status: DISCONTINUED | OUTPATIENT
Start: 2018-01-18 | End: 2018-01-18 | Stop reason: CLARIF

## 2018-01-18 RX ORDER — SODIUM CHLORIDE, SODIUM LACTATE, POTASSIUM CHLORIDE, CALCIUM CHLORIDE 600; 310; 30; 20 MG/100ML; MG/100ML; MG/100ML; MG/100ML
25 INJECTION, SOLUTION INTRAVENOUS CONTINUOUS
Status: DISCONTINUED | OUTPATIENT
Start: 2018-01-18 | End: 2018-01-18 | Stop reason: HOSPADM

## 2018-01-18 RX ORDER — HEPARIN SODIUM 5000 [USP'U]/ML
5000 INJECTION, SOLUTION INTRAVENOUS; SUBCUTANEOUS EVERY 12 HOURS
Status: DISCONTINUED | OUTPATIENT
Start: 2018-01-19 | End: 2018-01-23 | Stop reason: HOSPADM

## 2018-01-18 RX ORDER — SODIUM CHLORIDE 0.9 % (FLUSH) 0.9 %
5-10 SYRINGE (ML) INJECTION EVERY 8 HOURS
Status: DISCONTINUED | OUTPATIENT
Start: 2018-01-18 | End: 2018-01-18 | Stop reason: HOSPADM

## 2018-01-18 RX ORDER — ONDANSETRON 2 MG/ML
4 INJECTION INTRAMUSCULAR; INTRAVENOUS AS NEEDED
Status: DISCONTINUED | OUTPATIENT
Start: 2018-01-18 | End: 2018-01-18 | Stop reason: HOSPADM

## 2018-01-18 RX ORDER — GLYCOPYRROLATE 0.2 MG/ML
INJECTION INTRAMUSCULAR; INTRAVENOUS AS NEEDED
Status: DISCONTINUED | OUTPATIENT
Start: 2018-01-18 | End: 2018-01-18 | Stop reason: HOSPADM

## 2018-01-18 RX ORDER — MIDAZOLAM HYDROCHLORIDE 1 MG/ML
INJECTION, SOLUTION INTRAMUSCULAR; INTRAVENOUS AS NEEDED
Status: DISCONTINUED | OUTPATIENT
Start: 2018-01-18 | End: 2018-01-18 | Stop reason: HOSPADM

## 2018-01-18 RX ORDER — NEOSTIGMINE METHYLSULFATE 1 MG/ML
INJECTION INTRAVENOUS AS NEEDED
Status: DISCONTINUED | OUTPATIENT
Start: 2018-01-18 | End: 2018-01-18 | Stop reason: HOSPADM

## 2018-01-18 RX ORDER — ONDANSETRON 2 MG/ML
INJECTION INTRAMUSCULAR; INTRAVENOUS AS NEEDED
Status: DISCONTINUED | OUTPATIENT
Start: 2018-01-18 | End: 2018-01-18 | Stop reason: HOSPADM

## 2018-01-18 RX ORDER — PROPOFOL 10 MG/ML
INJECTION, EMULSION INTRAVENOUS AS NEEDED
Status: DISCONTINUED | OUTPATIENT
Start: 2018-01-18 | End: 2018-01-18 | Stop reason: HOSPADM

## 2018-01-18 RX ORDER — MORPHINE SULFATE 4 MG/ML
1-2 INJECTION INTRAVENOUS
Status: DISCONTINUED | OUTPATIENT
Start: 2018-01-18 | End: 2018-01-22

## 2018-01-18 RX ORDER — SUCCINYLCHOLINE CHLORIDE 20 MG/ML
INJECTION INTRAMUSCULAR; INTRAVENOUS AS NEEDED
Status: DISCONTINUED | OUTPATIENT
Start: 2018-01-18 | End: 2018-01-18 | Stop reason: HOSPADM

## 2018-01-18 RX ORDER — MORPHINE SULFATE IN 0.9 % NACL 150MG/30ML
PATIENT CONTROLLED ANALGESIA SYRINGE INTRAVENOUS
Status: DISCONTINUED | OUTPATIENT
Start: 2018-01-18 | End: 2018-01-22

## 2018-01-18 RX ORDER — CLINDAMYCIN PHOSPHATE 900 MG/50ML
INJECTION INTRAVENOUS AS NEEDED
Status: DISCONTINUED | OUTPATIENT
Start: 2018-01-18 | End: 2018-01-18 | Stop reason: HOSPADM

## 2018-01-18 RX ORDER — ROCURONIUM BROMIDE 10 MG/ML
INJECTION, SOLUTION INTRAVENOUS AS NEEDED
Status: DISCONTINUED | OUTPATIENT
Start: 2018-01-18 | End: 2018-01-18 | Stop reason: HOSPADM

## 2018-01-18 RX ORDER — DEXAMETHASONE SODIUM PHOSPHATE 4 MG/ML
INJECTION, SOLUTION INTRA-ARTICULAR; INTRALESIONAL; INTRAMUSCULAR; INTRAVENOUS; SOFT TISSUE AS NEEDED
Status: DISCONTINUED | OUTPATIENT
Start: 2018-01-18 | End: 2018-01-18 | Stop reason: HOSPADM

## 2018-01-18 RX ORDER — FENTANYL CITRATE 50 UG/ML
INJECTION, SOLUTION INTRAMUSCULAR; INTRAVENOUS AS NEEDED
Status: DISCONTINUED | OUTPATIENT
Start: 2018-01-18 | End: 2018-01-18 | Stop reason: HOSPADM

## 2018-01-18 RX ORDER — FENTANYL CITRATE 50 UG/ML
25 INJECTION, SOLUTION INTRAMUSCULAR; INTRAVENOUS
Status: DISCONTINUED | OUTPATIENT
Start: 2018-01-18 | End: 2018-01-18 | Stop reason: HOSPADM

## 2018-01-18 RX ORDER — PHENYLEPHRINE HCL IN 0.9% NACL 0.4MG/10ML
SYRINGE (ML) INTRAVENOUS AS NEEDED
Status: DISCONTINUED | OUTPATIENT
Start: 2018-01-18 | End: 2018-01-18 | Stop reason: HOSPADM

## 2018-01-18 RX ORDER — DIPHENHYDRAMINE HYDROCHLORIDE 50 MG/ML
12.5 INJECTION, SOLUTION INTRAMUSCULAR; INTRAVENOUS AS NEEDED
Status: DISCONTINUED | OUTPATIENT
Start: 2018-01-18 | End: 2018-01-18 | Stop reason: HOSPADM

## 2018-01-18 RX ORDER — MORPHINE SULFATE 10 MG/ML
INJECTION, SOLUTION INTRAMUSCULAR; INTRAVENOUS AS NEEDED
Status: DISCONTINUED | OUTPATIENT
Start: 2018-01-18 | End: 2018-01-18 | Stop reason: HOSPADM

## 2018-01-18 RX ORDER — LIDOCAINE HYDROCHLORIDE 20 MG/ML
INJECTION, SOLUTION EPIDURAL; INFILTRATION; INTRACAUDAL; PERINEURAL AS NEEDED
Status: DISCONTINUED | OUTPATIENT
Start: 2018-01-18 | End: 2018-01-18 | Stop reason: HOSPADM

## 2018-01-18 RX ADMIN — MIDAZOLAM HYDROCHLORIDE 2 MG: 1 INJECTION, SOLUTION INTRAMUSCULAR; INTRAVENOUS at 11:43

## 2018-01-18 RX ADMIN — NEOSTIGMINE METHYLSULFATE 3 MG: 1 INJECTION INTRAVENOUS at 17:49

## 2018-01-18 RX ADMIN — FENTANYL CITRATE 25 MCG: 50 INJECTION, SOLUTION INTRAMUSCULAR; INTRAVENOUS at 18:53

## 2018-01-18 RX ADMIN — Medication 80 MCG: at 14:26

## 2018-01-18 RX ADMIN — ROCURONIUM BROMIDE 10 MG: 10 INJECTION, SOLUTION INTRAVENOUS at 13:44

## 2018-01-18 RX ADMIN — MORPHINE SULFATE 2 MG: 10 INJECTION, SOLUTION INTRAMUSCULAR; INTRAVENOUS at 16:45

## 2018-01-18 RX ADMIN — DEXTROSE MONOHYDRATE, SODIUM CHLORIDE, AND POTASSIUM CHLORIDE 125 ML/HR: 50; 4.5; 1.49 INJECTION, SOLUTION INTRAVENOUS at 19:09

## 2018-01-18 RX ADMIN — Medication 40 MCG: at 15:48

## 2018-01-18 RX ADMIN — FENTANYL CITRATE 100 MCG: 50 INJECTION, SOLUTION INTRAMUSCULAR; INTRAVENOUS at 11:54

## 2018-01-18 RX ADMIN — Medication: at 19:46

## 2018-01-18 RX ADMIN — Medication 80 MCG: at 14:06

## 2018-01-18 RX ADMIN — SODIUM CHLORIDE, SODIUM LACTATE, POTASSIUM CHLORIDE, AND CALCIUM CHLORIDE 25 ML/HR: 600; 310; 30; 20 INJECTION, SOLUTION INTRAVENOUS at 01:07

## 2018-01-18 RX ADMIN — PROPOFOL 200 MG: 10 INJECTION, EMULSION INTRAVENOUS at 11:54

## 2018-01-18 RX ADMIN — CLINDAMYCIN PHOSPHATE 600 MG: 12 INJECTION, SOLUTION INTRAVENOUS at 22:22

## 2018-01-18 RX ADMIN — SODIUM CHLORIDE, SODIUM LACTATE, POTASSIUM CHLORIDE, AND CALCIUM CHLORIDE 25 ML/HR: 600; 310; 30; 20 INJECTION, SOLUTION INTRAVENOUS at 11:00

## 2018-01-18 RX ADMIN — Medication 40 MCG: at 12:49

## 2018-01-18 RX ADMIN — ROCURONIUM BROMIDE 10 MG: 10 INJECTION, SOLUTION INTRAVENOUS at 11:54

## 2018-01-18 RX ADMIN — Medication 40 MCG: at 14:00

## 2018-01-18 RX ADMIN — ROCURONIUM BROMIDE 10 MG: 10 INJECTION, SOLUTION INTRAVENOUS at 14:20

## 2018-01-18 RX ADMIN — FENTANYL CITRATE 25 MCG: 50 INJECTION, SOLUTION INTRAMUSCULAR; INTRAVENOUS at 19:08

## 2018-01-18 RX ADMIN — MORPHINE SULFATE 1 MG: 2 INJECTION, SOLUTION INTRAMUSCULAR; INTRAVENOUS at 01:48

## 2018-01-18 RX ADMIN — CLINDAMYCIN PHOSPHATE 600 MG: 12 INJECTION, SOLUTION INTRAVENOUS at 06:38

## 2018-01-18 RX ADMIN — ROCURONIUM BROMIDE 10 MG: 10 INJECTION, SOLUTION INTRAVENOUS at 15:30

## 2018-01-18 RX ADMIN — SODIUM CHLORIDE, SODIUM LACTATE, POTASSIUM CHLORIDE, AND CALCIUM CHLORIDE: 600; 310; 30; 20 INJECTION, SOLUTION INTRAVENOUS at 16:08

## 2018-01-18 RX ADMIN — GLYCOPYRROLATE 0.2 MG: 0.2 INJECTION INTRAMUSCULAR; INTRAVENOUS at 17:49

## 2018-01-18 RX ADMIN — ONDANSETRON 4 MG: 2 INJECTION INTRAMUSCULAR; INTRAVENOUS at 17:23

## 2018-01-18 RX ADMIN — ROCURONIUM BROMIDE 10 MG: 10 INJECTION, SOLUTION INTRAVENOUS at 14:43

## 2018-01-18 RX ADMIN — DEXAMETHASONE SODIUM PHOSPHATE 10 MG: 4 INJECTION, SOLUTION INTRA-ARTICULAR; INTRALESIONAL; INTRAMUSCULAR; INTRAVENOUS; SOFT TISSUE at 12:14

## 2018-01-18 RX ADMIN — Medication 10 ML: at 06:38

## 2018-01-18 RX ADMIN — FENTANYL CITRATE 25 MCG: 50 INJECTION, SOLUTION INTRAMUSCULAR; INTRAVENOUS at 19:03

## 2018-01-18 RX ADMIN — ROCURONIUM BROMIDE 10 MG: 10 INJECTION, SOLUTION INTRAVENOUS at 15:45

## 2018-01-18 RX ADMIN — LIDOCAINE HYDROCHLORIDE 100 MG: 20 INJECTION, SOLUTION EPIDURAL; INFILTRATION; INTRACAUDAL; PERINEURAL at 11:54

## 2018-01-18 RX ADMIN — FENTANYL CITRATE 50 MCG: 50 INJECTION, SOLUTION INTRAMUSCULAR; INTRAVENOUS at 15:30

## 2018-01-18 RX ADMIN — FENTANYL CITRATE 25 MCG: 50 INJECTION, SOLUTION INTRAMUSCULAR; INTRAVENOUS at 18:22

## 2018-01-18 RX ADMIN — Medication 40 MCG: at 12:46

## 2018-01-18 RX ADMIN — ROCURONIUM BROMIDE 20 MG: 10 INJECTION, SOLUTION INTRAVENOUS at 12:04

## 2018-01-18 RX ADMIN — SUCCINYLCHOLINE CHLORIDE 140 MG: 20 INJECTION INTRAMUSCULAR; INTRAVENOUS at 11:54

## 2018-01-18 RX ADMIN — Medication 40 MCG: at 16:27

## 2018-01-18 RX ADMIN — ACETAMINOPHEN 1000 MG: 10 INJECTION, SOLUTION INTRAVENOUS at 16:50

## 2018-01-18 RX ADMIN — FENTANYL CITRATE 25 MCG: 50 INJECTION, SOLUTION INTRAMUSCULAR; INTRAVENOUS at 13:52

## 2018-01-18 RX ADMIN — ROCURONIUM BROMIDE 20 MG: 10 INJECTION, SOLUTION INTRAVENOUS at 12:29

## 2018-01-18 RX ADMIN — Medication 80 MCG: at 15:42

## 2018-01-18 RX ADMIN — Medication 40 MCG: at 16:30

## 2018-01-18 RX ADMIN — CLINDAMYCIN PHOSPHATE 900 MG: 900 INJECTION INTRAVENOUS at 12:50

## 2018-01-18 RX ADMIN — SODIUM CHLORIDE, SODIUM LACTATE, POTASSIUM CHLORIDE, AND CALCIUM CHLORIDE 25 ML/HR: 600; 310; 30; 20 INJECTION, SOLUTION INTRAVENOUS at 01:08

## 2018-01-18 RX ADMIN — FENTANYL CITRATE 25 MCG: 50 INJECTION, SOLUTION INTRAMUSCULAR; INTRAVENOUS at 13:44

## 2018-01-18 RX ADMIN — FENTANYL CITRATE 50 MCG: 50 INJECTION, SOLUTION INTRAMUSCULAR; INTRAVENOUS at 12:57

## 2018-01-18 RX ADMIN — SODIUM CHLORIDE, SODIUM LACTATE, POTASSIUM CHLORIDE, AND CALCIUM CHLORIDE: 600; 310; 30; 20 INJECTION, SOLUTION INTRAVENOUS at 12:50

## 2018-01-18 RX ADMIN — MORPHINE SULFATE 1 MG: 10 INJECTION, SOLUTION INTRAMUSCULAR; INTRAVENOUS at 17:22

## 2018-01-18 NOTE — PROGRESS NOTES
Oncology Nursing Communication Tool  7:28 AM  1/18/2018     Bedside shift change report given to Mary Arias RN (incoming nurse) by Moses Bach (outgoing nurse) on [facility]. Report included the following information SBAR, Kardex, Intake/Output, MAR and Recent Results. Shift Summary: paged MD x2, once to order home meds losartan and prozac, and again to order morphine as pt did not like dilaudid. Pt has been NPO since midnight. To have ERCP and lap choly today. Issues for physician to address: none         Oncology Shift Note   Admission Date 1/17/2018   Admission Diagnosis Gallstone/IV ABX  Jaundice   Code Status Full Code   Consults IP CONSULT TO GASTROENTEROLOGY      Cardiac Monitoring [] Yes [x] No      Purposeful Hourly Rounding [x] Yes    Marcin Score Total Score: 1   Marcin score 3 or > [] Bed Alarm [] Avasys [] 1:1 sitter [] Patient refused (Place signed refusal form in chart)      Pain Managed [x] Yes [] No    Key Pain Meds             ibuprofen (MOTRIN) 800 mg tablet Take 1 Tab by mouth every eight (8) hours. Influenza Vaccine Received Flu Vaccine for Current Season (usually Sept-March): Yes           Oxygen needs?  [x] Room air Oxygen @  []1L    []2L    []3L   []4L    []5L   []6L     Use home O2? [] Yes [] No  Perform O2 challenge test using  smartphrase (.oxygenchallenge)      Last bowel movement Last Bowel Movement Date: 01/17/18  bowel movement      Urinary Catheter             LDAs               Peripheral IV 01/17/18 Right Antecubital (Active)   Site Assessment Clean, dry, & intact 1/18/2018  2:38 AM   Phlebitis Assessment 0 1/18/2018  2:38 AM   Infiltration Assessment 0 1/18/2018  2:38 AM   Dressing Status Clean, dry, & intact 1/18/2018  2:38 AM   Dressing Type Tape;Transparent 1/18/2018  2:38 AM   Hub Color/Line Status Blue;Flushed;Patent 1/18/2018  2:38 AM   Action Taken Other (comment) 1/18/2018  2:38 AM                         Readmission Risk Assessment Tool Score Low Risk            3       Total Score        3 Has Seen PCP in Last 6 Months (Yes=3, No=0)        Criteria that do not apply:    . Living with Significant Other. Assisted Living. LTAC. SNF. or   Rehab    Patient Length of Stay (>5 days = 3)    IP Visits Last 12 Months (1-3=4, 4=9, >4=11)    Pt.  Coverage (Medicare=5 , Medicaid, or Self-Pay=4)    Charlson Comorbidity Score (Age + Comorbid Conditions)       Expected Length of Stay - - -   Actual Length of Stay [address]

## 2018-01-18 NOTE — OP NOTES
Patient ID:   Name: Alcides Hazel   Medical Record Number: [id number]   YOB: 1980    Date of Surgery: 1/18/2018      Preoperative Diagnosis:     1. Gallstones  2. Common bile duct stone    Postoperative Diagnosis:    1. Gallstones  2. Common bile duct stone  3. Severe cholecystitis    Procedures:    1. Laparoscopic converted to open cholecystectomy with intraoperative cholangiogram  2. Common bile duct exploration with removal of common bile duct stones  3. T-tube (14 Fr) placement    Surgeon: Tami Alvarez MD      Fist assistant:  Symone Smart MD    Anesthesia: General    Findings:   1. Severely inflammed gallbladder with wall thickening and numerous stones. 2.  Almost non-existent cystic duct stump  3. Multiple filling defects within the common bile duct    Estimated Blood Loss: 200cc    Specimens:   gallbladder    Indications: Patient 42 y.o.  female was seen at [facility] on 1/17/18 with 1 day history of intractable nausea, vomiting and achy igastric pain radiating to the RUQ and back.  Labs done at urgent care was remarkable for elevated LFTs.  TB was 4.0, , , and .  Patient subsequently underwent US on 1/9/18 which showed gallstone without ductal dilation.  Patient does have history of biliary colic for few years.  Repeat LFTs on 1/16/18 showed TB 7.9, ASLP 306 and CT scan showed cholelithiasis with CBD stone and mild intrahepatic biliary dilation; enlarged celiac axis node. patient presents today for ERCP and cholecystectomy. Procedure Details: The patient was seen in the Holding Room. The risks, benefits, complications, treatment options, and expected outcomes were discussed with the patient. After informed consent was performed, patient was taken to the operating room. After ERCP was completed by Dr. Sushil Summers, patient was placed supine in the operating table. Abdomen was prepped and draped in standard surgical fashion.   Small right subcostal incision was made and a 5 mm blunt trocar was placed under direct vision. CO2 pneumoperitoneum was then created. Additional 5 mm blunt trocar was placed supraumbilical and a 12 mm trocar was placed in the epigastric area. Abdomen was explored. Patient was found to have omentum adherent to the gallbladder. This was carefully taken down. This allowed exposure to the gallbladder which was inflammed with wall thickening and adhesions. Additional 5 mm trocar was placed in the right upper quadrant. Adhesions were taken down carefully and the base of the gallbladder was carefully dissected. Patient noted to have severe inflammation and scarring which made the dissection quite difficult. As we were dissecting around the base of the gallbladder, thickened and dilated tubular structure was encountered. Patient noted to have a vessel running anterior to what appears to be dilated cystic duct. This was clipped x 3 then divided. Base of the gallbladder was carefully dissected trying to isolate the cystic duct. While dissecting around this structure, small tear was noted with drainage of clear bile and stones. With concern of possible CBD injury, cholangiogram was performed through this defect and it showed dilated CBD with filling defects. Contrast filled both proximally and distally. Patient appeared to have very short cystic duct stump. Cholangiogram catheter was removed. Due significant inflammatory changes at the base of the gallbladder, dissection was changed to dome down technique. To minimize the bleeding, posterior wall of the upper part of the gallbladder was left behind. When the gallbladder was opened, patient noted to have hydrops with numerous medium to large size stones. Dissection continued down towards the base of the gallbladder. Before continuing down to the cystic duct, multiple stones were removed in the endocatch bag through 12 mm trocar site.   Dissection continued down towards the cystic duct. Cystic artery was adherent to the dilated cystic duct which was isolated, clipped and divided. This left gallbladder adherent by very short cystic duct stump measuring 1 mm. The tear was noted within this 1 mm segment. No injury to the CBD noted. Cholangiogram was performed again which confirmed almost non-existent cystic duct stump. Patient noted to have have multiple filling defects in the common bile duct, but was able to fill both proximally and distally. With concerns of closing the cystic duct stump and to clear the common bile duct, decision was made at this time to covert to open. The 12 mm trocar site in the epigastric area with closed with figure of eight 0-0 Vircyl using Endoclose. The trocars were removed and CO2 pneumoperitoneum was released. Right subcostal incision was made incorporating one of the trocar site. Peritoneum was then sharply entered. Bookwalter retractor was placed and the gallbladder fossa was exposed. Just from finger message, we were able to express some of the stones out of the common bile duct. The gallbladder was transected at the base which left 1 mm of cystic duct stump. Specimen was sent to pathology. Sindy catheter was used to clear the common bile duct both proximally and distally. Multiple stones and pus was drained. Cholangiogram was performed again to confirm clearing of common bile duct. A 14 Fr T-tube was then placed and it was cinched both superiorly and inferiorly around the duct using interrupted 4-0 PDS. T-tube flushed easily and no obvious leak noted around the T-tube. Cholangiogram was performed again through the T-tube. Common bile duct was patent without obvious filling defects and good drainage to duodenum. Abdomen was extensively irrigated and good hemostasis was obtained. Stab incision was made just below the subcostal incision and the T-tube was brought out.   A #19 Dorian drain was placed in the gallbladder fossa and was brought out through RUQ trocar site. Both tubes were secured to the skin with 3-0 Nylon. The right subcostal fascial incision was closed with running #1-0 loop Maxon. Subcutaneous tissue was approximated using interrupted 3-0 Vicryl and skin was closed with skin chema. JN dressing was then applied to the incision. Supraumbilical skin incision was closed using 4-0 Vicryl Monocryl stitch followed by Dermabond. Instrument, sponge, and needle counts were correct prior to closure and at the conclusion of the case. The patient was taken to recovery room in good condition having tolerated the procedure well.       CC:  Sam Mora MD

## 2018-01-18 NOTE — PROGRESS NOTES
Bedside and Verbal shift change report given to Jackelin Garcia rn (oncoming nurse) by Samuel Mckeon (offgoing nurse). Report included the following information SBAR, Procedure Summary, Intake/Output, MAR and Recent Results.

## 2018-01-18 NOTE — PERIOP NOTES
Handoff Report from Operating Room to PACU    Report received from [facility] Dianna Alvarez CRNA regarding Hilary Sanderson. Surgeon(s):  MD Rosalinda Parson MD Brena Merchant, MD  And Procedure(s) (LRB):   LAPAROSCOPIC CHOLECYSTECTOY, intraoperative cholangiogram CONVERT TO OPEN AT 1530h with common bile duct exploration under fluoroscopy, removal of common bile duct stones, t tube insertion (N/A)  ENDOSCOPIC RETROGRADE CHOLANGIOPANCREATOGRAPHY (ERCP), sphincterotomy, common bile duct stone extraction (N/A)  confirmed   with allergies, drains and dressings discussed. Anesthesia type, drugs, patient history, complications, estimated blood loss, vital signs, intake and output, and last pain medication, lines and reversal medications were reviewed.     Jenn Mckeon- Student Nurse

## 2018-01-18 NOTE — PROCEDURES
Endoscopic Retrograde Cholangiopancreatography Procedure Note  Dr. Sridhar Quan   1980   [id number]     Procedure Type:   ERCPwith biliary sphincterotomy, biliary stone removal     Indications: Obstructed CBD with stone on CT scan; Jaundice+    Pre-operative Diagnosis: GALLSTONES    Post-operative Diagnosis: Cholangitis, common bile duct stone    : J Luis Palomo MD    Referring Provider:Chuck Kelley MD    Sedation:  General anesthesia    Procedure Details:    After informed consent was obtained with all risks and benefits of procedure explained, the patient was taken to the fluoroscopy suite and placed in the prone position. Upon sequential sedation as per above, the Olympus duodenoscope [id number]   was inserted via the mouthpeice and carefully advanced to the second portion of the duodenum. The quality of visualization was adequate. The duodenoscope was withdrawn into a short position. Findings:   Esophagus:normal  Stomach: normal   Duodenum/jejunum: normal    Ampulla:normal with some whitish purlent liquid draining but no bile  Cholangiogram: -CBD cannulated on first attempt with wire confirmation. We injected full strength Conray contrast and saw a dilated CBD up to 10 mm with a distal nonopacification c/w stone present. Pus was seen draining with cannulation and we proceeded with a sphincterotomy 12 mm in size and then performed a balloon sweep over wire with dark black stone fragment removed: johnnie shaped. We then performed a cholangiogram again seeing some distal stone debris and swept duct with 12 mm balloon fully inflated and an additional dark stone fragment was removed successfully. We then performed 2 additional sweeps with 12 mm balloon inflated and no debris, only purulent liquid and eventually with lavaging with saline within bile duct we saw clear yellow bile.   Occlusion cholangiogram showed no further stones and GB filled via cystic duct so we removed balloon catheter and wire and procedure completed. Pancreatogram:not performed    Specimen Removed:  None    Complications: None.      EBL: < 20 ml    Interventions:    Pancreatic: none  Biliary: Canulation  successful,  standard Papillotomy and Stone Extraction  balloon with  complete clearance    Impression: Cholangitis secondary to obstruction of CBD with gallstone s/p ERCP with sphincterotomy and stone extraction    Recommendations: proceed with planned lap tesha and post op care          Radha Doherty MD  1/18/2018  12:36 PM

## 2018-01-18 NOTE — PROGRESS NOTES
Oncology Interdisciplinary rounds were held today to discuss patient plan of care and outcomes. The following members were present: Nursing, Case Management, and Pharmacy.     ALOS: 1            Plan for Day       Mobility        Plan for Stay   Discharge Disposition   ERCP  EGD   Up ad yosvany IV fluids  IV antitbiotics  Pain management Home when medically cleared

## 2018-01-18 NOTE — H&P
H&P Update:  Stefani Mosley was seen and examined. History and physical has been reviewed. The patient has been examined.  There have been no significant clinical changes since the completion of the originally dated History and Physical.    Signed By: Dillon Ngo MD     January 18, 2018 11:31 AM

## 2018-01-18 NOTE — PROGRESS NOTES
Pt asked about home medications, losartan 50 mg and prozac 60 mg. They were not on her MAR, she said she takes them both at night. RN checked PTA med list, they were both listed. RN paged Dr. Ney Norwood, received orders for both. Both given at 2130. Pt c/o pain at 0130. She had previously received dilaudid for pain, she said it was too strong and made her \"loopy\" and very drowsy. She requested not to have that again. She said she paul rather suffer through the pain than take the dilaudid again. RN paged Dr Ney Norwood, received orders for morphine 1-2 mg IV q4h PRN. RN gave 1 mg at [address] kylee, pt's pain was relieved and she didn't have the same side effects as the dilaudid.

## 2018-01-18 NOTE — PERIOP NOTES
2189:  TRANSFER - IN REPORT:    Verbal report received from [facility] on Eusebia Wilkes  being received from [phone] for ordered procedure. Estimated time for  given as 1015 this a.m. Report consisted of patients Situation, Background, Assessment and   Recommendations(SBAR). Information from the following report(s) SBAR, Kardex, Intake/Output, MAR and Recent Results was reviewed with the receiving nurse. Opportunity for questions and clarification was provided. 1020:  Assessment completed upon patients arrival to unit and care assumed. 1022:  Patient assisted OOB to BR so we could obtain a urine sample for UPT    1030:  Dr. Lalito Estevez nurse in to see the patient    [id number]:  Dr. Monika Morton in to see the patient. Patient c/o 22g to RAC painful & itchy. IV removed. 20g to right hand initiated by Toya Chavira, ABELINO in 2 attempts.     1125:  Dr. Dara Ortega in to see the patient & discuss plan of care

## 2018-01-18 NOTE — ANESTHESIA PREPROCEDURE EVALUATION
Anesthetic History   No history of anesthetic complications            Review of Systems / Medical History  Patient summary reviewed, nursing notes reviewed and pertinent labs reviewed    Pulmonary            Asthma        Neuro/Psych   Within defined limits           Cardiovascular    Hypertension        Dysrhythmias : PVC      Exercise tolerance: >4 METS     GI/Hepatic/Renal  Within defined limits              Endo/Other        Morbid obesity     Other Findings   Comments:   Jaundice          Physical Exam    Airway  Mallampati: II  TM Distance: 4 - 6 cm  Neck ROM: normal range of motion   Mouth opening: Diminished (comment)     Cardiovascular  Regular rate and rhythm,  S1 and S2 normal,  no murmur, click, rub, or gallop             Dental  No notable dental hx       Pulmonary  Breath sounds clear to auscultation               Abdominal  GI exam deferred       Other Findings            Anesthetic Plan    ASA: 3  Anesthesia type: general            Anesthetic plan and risks discussed with: Patient

## 2018-01-19 LAB
ALBUMIN SERPL-MCNC: 2.2 G/DL (ref 3.5–5)
ALBUMIN/GLOB SERPL: 0.5 {RATIO} (ref 1.1–2.2)
ALP SERPL-CCNC: 381 U/L (ref 45–117)
ALT SERPL-CCNC: 118 U/L (ref 12–78)
ANION GAP SERPL CALC-SCNC: 6 MMOL/L (ref 5–15)
AST SERPL-CCNC: 107 U/L (ref 15–37)
BILIRUB SERPL-MCNC: 6.6 MG/DL (ref 0.2–1)
BUN SERPL-MCNC: 15 MG/DL (ref 6–20)
BUN/CREAT SERPL: 19 (ref 12–20)
CALCIUM SERPL-MCNC: 8.4 MG/DL (ref 8.5–10.1)
CHLORIDE SERPL-SCNC: 101 MMOL/L (ref 97–108)
CO2 SERPL-SCNC: 30 MMOL/L (ref 21–32)
CREAT SERPL-MCNC: 0.79 MG/DL (ref 0.55–1.02)
ERYTHROCYTE [DISTWIDTH] IN BLOOD BY AUTOMATED COUNT: 16.9 % (ref 11.5–14.5)
GLOBULIN SER CALC-MCNC: 4.6 G/DL (ref 2–4)
GLUCOSE SERPL-MCNC: 109 MG/DL (ref 65–100)
HCT VFR BLD AUTO: 31.8 % (ref 35–47)
HGB BLD-MCNC: 10.6 G/DL (ref 11.5–16)
MCH RBC QN AUTO: 26.9 PG (ref 26–34)
MCHC RBC AUTO-ENTMCNC: 33.3 G/DL (ref 30–36.5)
MCV RBC AUTO: 80.7 FL (ref 80–99)
PLATELET # BLD AUTO: 503 K/UL (ref 150–400)
POTASSIUM SERPL-SCNC: 4.3 MMOL/L (ref 3.5–5.1)
PROT SERPL-MCNC: 6.8 G/DL (ref 6.4–8.2)
RBC # BLD AUTO: 3.94 M/UL (ref 3.8–5.2)
SODIUM SERPL-SCNC: 137 MMOL/L (ref 136–145)
WBC # BLD AUTO: 17.7 K/UL (ref 3.6–11)

## 2018-01-19 PROCEDURE — 97530 THERAPEUTIC ACTIVITIES: CPT

## 2018-01-19 PROCEDURE — 65270000029 HC RM PRIVATE

## 2018-01-19 PROCEDURE — 97165 OT EVAL LOW COMPLEX 30 MIN: CPT | Performed by: OCCUPATIONAL THERAPIST

## 2018-01-19 PROCEDURE — 80053 COMPREHEN METABOLIC PANEL: CPT | Performed by: SURGERY

## 2018-01-19 PROCEDURE — 74011000258 HC RX REV CODE- 258: Performed by: SURGERY

## 2018-01-19 PROCEDURE — 97530 THERAPEUTIC ACTIVITIES: CPT | Performed by: OCCUPATIONAL THERAPIST

## 2018-01-19 PROCEDURE — 74011250636 HC RX REV CODE- 250/636: Performed by: SURGERY

## 2018-01-19 PROCEDURE — 97535 SELF CARE MNGMENT TRAINING: CPT | Performed by: OCCUPATIONAL THERAPIST

## 2018-01-19 PROCEDURE — G8979 MOBILITY GOAL STATUS: HCPCS

## 2018-01-19 PROCEDURE — 74011250637 HC RX REV CODE- 250/637: Performed by: SURGERY

## 2018-01-19 PROCEDURE — 97116 GAIT TRAINING THERAPY: CPT

## 2018-01-19 PROCEDURE — 97161 PT EVAL LOW COMPLEX 20 MIN: CPT

## 2018-01-19 PROCEDURE — G8978 MOBILITY CURRENT STATUS: HCPCS

## 2018-01-19 PROCEDURE — 36415 COLL VENOUS BLD VENIPUNCTURE: CPT | Performed by: SURGERY

## 2018-01-19 PROCEDURE — 74011000250 HC RX REV CODE- 250: Performed by: SURGERY

## 2018-01-19 PROCEDURE — 85027 COMPLETE CBC AUTOMATED: CPT | Performed by: SURGERY

## 2018-01-19 RX ORDER — METRONIDAZOLE 500 MG/100ML
500 INJECTION, SOLUTION INTRAVENOUS EVERY 8 HOURS
Status: DISCONTINUED | OUTPATIENT
Start: 2018-01-19 | End: 2018-01-23 | Stop reason: HOSPADM

## 2018-01-19 RX ORDER — AZTREONAM 1 G/1
1 INJECTION, POWDER, LYOPHILIZED, FOR SOLUTION INTRAMUSCULAR; INTRAVENOUS EVERY 8 HOURS
Status: DISCONTINUED | OUTPATIENT
Start: 2018-01-19 | End: 2018-01-19 | Stop reason: SDUPTHER

## 2018-01-19 RX ORDER — TRAMADOL HYDROCHLORIDE 50 MG/1
50 TABLET ORAL
Status: DISCONTINUED | OUTPATIENT
Start: 2018-01-19 | End: 2018-01-23 | Stop reason: HOSPADM

## 2018-01-19 RX ADMIN — AZTREONAM 1 G: 1 INJECTION, POWDER, LYOPHILIZED, FOR SOLUTION INTRAMUSCULAR; INTRAVENOUS at 15:39

## 2018-01-19 RX ADMIN — HEPARIN SODIUM 5000 UNITS: 5000 INJECTION, SOLUTION INTRAVENOUS; SUBCUTANEOUS at 22:13

## 2018-01-19 RX ADMIN — DEXTROSE MONOHYDRATE, SODIUM CHLORIDE, AND POTASSIUM CHLORIDE 125 ML/HR: 50; 4.5; 1.49 INJECTION, SOLUTION INTRAVENOUS at 20:31

## 2018-01-19 RX ADMIN — CLINDAMYCIN PHOSPHATE 600 MG: 12 INJECTION, SOLUTION INTRAVENOUS at 06:11

## 2018-01-19 RX ADMIN — Medication 10 ML: at 20:32

## 2018-01-19 RX ADMIN — Medication 10 ML: at 06:11

## 2018-01-19 RX ADMIN — METRONIDAZOLE 500 MG: 500 INJECTION, SOLUTION INTRAVENOUS at 17:24

## 2018-01-19 RX ADMIN — DEXTROSE MONOHYDRATE, SODIUM CHLORIDE, AND POTASSIUM CHLORIDE 125 ML/HR: 50; 4.5; 1.49 INJECTION, SOLUTION INTRAVENOUS at 03:37

## 2018-01-19 RX ADMIN — FLUOXETINE 60 MG: 20 CAPSULE ORAL at 10:39

## 2018-01-19 RX ADMIN — HEPARIN SODIUM 5000 UNITS: 5000 INJECTION, SOLUTION INTRAVENOUS; SUBCUTANEOUS at 10:39

## 2018-01-19 RX ADMIN — DEXTROSE MONOHYDRATE, SODIUM CHLORIDE, AND POTASSIUM CHLORIDE 125 ML/HR: 50; 4.5; 1.49 INJECTION, SOLUTION INTRAVENOUS at 14:49

## 2018-01-19 RX ADMIN — METRONIDAZOLE 500 MG: 500 INJECTION, SOLUTION INTRAVENOUS at 10:39

## 2018-01-19 RX ADMIN — AZTREONAM 1 G: 1 INJECTION, POWDER, LYOPHILIZED, FOR SOLUTION INTRAMUSCULAR; INTRAVENOUS at 20:31

## 2018-01-19 NOTE — PROGRESS NOTES
Problem: Self Care Deficits Care Plan (Adult)  Goal: *Acute Goals and Plan of Care (Insert Text)  Occupational Therapy Goals:  Initiated 1/19/2018  1. Patient will perform grooming standing with modified independence within 7 days. 2. Patient will perform toileting with modified independence within 7 days. 3. Patient will perform lower body dressing with modified independence within 7 days. 4. Patient will transfer from toilet with modified independence using the least restrictive device and appropriate durable medical equipment within 7 days. Occupational Therapy EVALUATION  Patient: Troy Chery (13 y.o. female)  Date: 1/19/2018  Primary Diagnosis: Gallstone/IV ABX  Jaundice  GALLSTONES  Common bile duct stone  Procedure(s) (LRB):   LAPAROSCOPIC CHOLECYSTECTOY, intraoperative cholangiogram CONVERT TO OPEN AT 1530h with common bile duct exploration under fluoroscopy, removal of common bile duct stones, t tube insertion (N/A)  ENDOSCOPIC RETROGRADE CHOLANGIOPANCREATOGRAPHY (ERCP), sphincterotomy, common bile duct stone extraction (N/A) 1 Day Post-Op   Precautions:   Fall    ASSESSMENT :  Based on the objective data described below, the patient presents with drowsiness this session and had eyes closed when not interacting. Spoke with nursing and PCA dose is being adjusted. Educated pt on log roll technique and pt requires min assist and additional time with moderate assist of 2 due to pain. Good static balance and fair dynamic balance standing this session. CGA for transfer to bedside commode and with mobility around bed with RW. During toileting today pt reported to much pain to attempt and total assist needed due to pain and girth. Pt is performing UB ADLS at a independent to moderate assist level and lower body ADLS at a total assist level. Pt needs encouragement to mobilize and participate due to drowsiness, pain and anticipation of pain.   Reinforced with pt and her  on the importance of pt continuing to mobilize with nursing, being OOB at least 3x a day and starting to mobilize to bathroom with nursing. Pt verbalized understanding. Also educated nursing to continue to mobilize pt over the weekend. Vitals were stable this session. Patient will benefit from skilled intervention to address the above impairments. Patients rehabilitation potential is considered to be Good  Factors which may influence rehabilitation potential include:   [x]             None noted  []             Mental ability/status  []             Medical condition  []             Home/family situation and support systems  []             Safety awareness  []             Pain tolerance/management  []             Other:      PLAN :  Recommendations and Planned Interventions:  [x]               Self Care Training                  [x]        Therapeutic Activities  [x]               Functional Mobility Training    []        Cognitive Retraining  [x]               Therapeutic Exercises           []        Endurance Activities  [x]               Balance Training                   []        Neuromuscular Re-Education  []               Visual/Perceptual Training     [x]   Home Safety Training  [x]               Patient Education                 [x]        Family Training/Education  []               Other (comment):    Frequency/Duration: Patient will be followed by occupational therapy 4 times a week to address goals. Discharge Recommendations: Home Health vs. Home without services  Further Equipment Recommendations for Discharge: TBD     SUBJECTIVE:   Patient stated It is going to hurt.     OBJECTIVE DATA SUMMARY:   HISTORY:   Past Medical History:   Diagnosis Date    Asthma     Hypertension     PVC (premature ventricular contraction)      Past Surgical History:   Procedure Laterality Date    HX OTHER SURGICAL      Vaginal delivery    HX TONSIL AND ADENOIDECTOMY         Prior Level of Function/Environment/Context: independent without assist devices; works as a      Expanded or extensive additional review of patient history:     Home Situation  Home Environment: Private residence  # Steps to Enter: 4  Rails to Enter: Yes  [employer] : Left (fence on right side with beam)  One/Two Story Residence: Two story  # of Interior Steps: 12  Interior Rails: Right  Living Alone: No  Support Systems: Family member(s)  Patient Expects to be Discharged to[de-identified] Private residence  Current DME Used/Available at Home: None  [x]  Right hand dominant   []  Left hand dominant    EXAMINATION OF PERFORMANCE DEFICITS:  Cognitive/Behavioral Status:           Perception: Appears intact  Perseveration: No perseveration noted  Safety/Judgement: Awareness of environment; Fall prevention        Hearing: Auditory  Auditory Impairment: None  Hearing Aids/Status: Does not own    Vision/Perceptual:                                Corrective Lenses:  (grossly intact)    Range of Motion:    AROM: Generally decreased, functional                         Strength:    Strength: Generally decreased, functional                Coordination:  Coordination: Within functional limits  Fine Motor Skills-Upper: Left Intact; Right Intact    Gross Motor Skills-Upper: Left Intact; Right Intact    Tone & Sensation:    Tone: Normal  Sensation: Intact                      Balance:  Sitting: Impaired  Sitting - Static: Good (unsupported)  Sitting - Dynamic: Fair (occasional)  Standing: Impaired  Standing - Static: Good  Standing - Dynamic : Fair    Functional Mobility and Transfers for ADLs:  Bed Mobility:  Rolling: Moderate assistance;Minimum assistance;Assist x2; Additional time  Supine to Sit: Moderate assistance;Minimum assistance;Assist x2  Scooting: Contact guard assistance    Transfers:  Sit to Stand: Contact guard assistance  Stand to Sit: Contact guard assistance  Bed to Chair: Contact guard assistance; Additional time (use of RW for ambulation)  Toilet Transfer : Contact guard assistance    ADL Assessment:  Feeding: Independent    Oral Facial Hygiene/Grooming: Contact guard assistance    Bathing: Maximum assistance    Upper Body Dressing: Moderate assistance    Lower Body Dressing: Total assistance    Toileting: Total assistance                ADL Intervention and task modifications:   see assessment  Cognitive Retraining  Safety/Judgement: Awareness of environment; Fall prevention      Functional Measure:  Barthel Index:    Bathin  Bladder: 10  Bowels: 10  Groomin  Dressin  Feedin  Mobility: 0  Stairs: 0  Toilet Use: 0  Transfer (Bed to Chair and Back): 10  Total: 40       Barthel and G-code impairment scale:  Percentage of impairment CH  0% CI  1-19% CJ  20-39% CK  40-59% CL  60-79% CM  80-99% CN  100%   Barthel Score 0-100 100 99-80 79-60 59-40 20-39 1-19   0   Barthel Score 0-20 20 17-19 13-16 9-12 5-8 1-4 0      The Barthel ADL Index: Guidelines  1. The index should be used as a record of what a patient does, not as a record of what a patient could do. 2. The main aim is to establish degree of independence from any help, physical or verbal, however minor and for whatever reason. 3. The need for supervision renders the patient not independent. 4. A patient's performance should be established using the best available evidence. Asking the patient, friends/relatives and nurses are the usual sources, but direct observation and common sense are also important. However direct testing is not needed. 5. Usually the patient's performance over the preceding 24-48 hours is important, but occasionally longer periods will be relevant. 6. Middle categories imply that the patient supplies over 50 per cent of the effort. 7. Use of aids to be independent is allowed. Patricia Beaver., Barthel, D.W. ([id number]). Functional evaluation: the Barthel Index. [address] [facility] (14)2. GERARD Brown, Annat Mata., Gustavo Perez., Ulla Gowers, [address] [place] [address] ().  Measuring the change indisability after inpatient rehabilitation; comparison of the responsiveness of the Barthel Index and Functional [place] Measure. Journal of Neurology, Neurosurgery, and Psychiatry, 66(4), 797-641. PHILIPPE Crenshaw, LUIS MIGUEL Mathis, & Matthew Herrera M.A. (2004.) Assessment of post-stroke quality of life in cost-effectiveness studies: The usefulness of the Barthel Index and the EuroQoL-5D. Quality of Life Research, 13, 746-88         G codes: In compliance with CMSs Claims Based Outcome Reporting, the following G-code set was chosen for this patient based on their primary functional limitation being treated: The outcome measure chosen to determine the severity of the functional limitation was the barthel with a score of 40/100 which was correlated with the impairment scale. ? Self Care:     - CURRENT STATUS: CK - 40%-59% impaired, limited or restricted    - GOAL STATUS: CJ - 20%-39% impaired, limited or restricted    - D/C STATUS:  ---------------To be determined---------------     Occupational Therapy Evaluation Charge Determination   History Examination Decision-Making   LOW Complexity : Brief history review  LOW Complexity : 1-3 performance deficits relating to physical, cognitive , or psychosocial skils that result in activity limitations and / or participation restrictions  LOW Complexity : No comorbidities that affect functional and no verbal or physical assistance needed to complete eval tasks       Based on the above components, the patient evaluation is determined to be of the following complexity level: LOW   Pain:         7/10 abdomen; has PCA            Activity Tolerance:     Please refer to the flowsheet for vital signs taken during this treatment.   After treatment:   [x] Patient left in no apparent distress sitting up in recliner chair  [] Patient left in no apparent distress in bed  [x] Call bell left within reach  [x] Nursing notified  [x] Caregiver present  [] Bed alarm activated    COMMUNICATION/EDUCATION:   The patients plan of care was discussed with: Physical Therapist, Registered Nurse and patient and her . [] Home safety education was provided and the patient/caregiver indicated understanding. [x] Patient/family have participated as able in goal setting and plan of care. [x] Patient/family agree to work toward stated goals and plan of care. [] Patient understands intent and goals of therapy, but is neutral about his/her participation. [] Patient is unable to participate in goal setting and plan of care. This patients plan of care is appropriate for delegation to [facility].     Thank you for this referral.  Prashant Bustamante OTR/L  Time Calculation: 39 mins

## 2018-01-19 NOTE — PROGRESS NOTES
General Surgery End of Shift Nursing Note    Bedside shift change report given to Hitesh Zamudio (oncoming nurse) by Deborah Koehler (offgoing nurse). Report included the following information SBAR and Kardex. Shift worked:   7p-7a   Summary of shift:    Post op. Patient in a lot of pain, controlled by PCA. Voiding. Issues for physician to address:        Number times ambulated in hallway past shift: 0    Number of times OOB to chair past shift: 0    Pain Management:  Current medication: Morphine PCA  Patient states pain is manageable on current pain medication: YES    GI:    Current diet:  DIET CLEAR LIQUID    Tolerating current diet: YES  Passing flatus: NO    Respiratory:    Incentive Spirometer at bedside: YES  Patient instructed on use: YES    Patient Safety:    Falls Score: 2  Bed Alarm On? No  Sitter?  No    Humza Modi RN

## 2018-01-19 NOTE — PROGRESS NOTES
Problem: Mobility Impaired (Adult and Pediatric)  Goal: *Acute Goals and Plan of Care (Insert Text)  Physical Therapy Goals  Initiated 1/19/2018  1. Patient will move from supine to sit and sit to supine , scoot up and down and roll side to side in bed with modified independence within 7 day(s). 2.  Patient will transfer from bed to chair and chair to bed with modified independence using the least restrictive device within 7 day(s). 3.  Patient will perform sit to stand with independence within 7 day(s). 4.  Patient will ambulate with modified independence for 75 feet with the least restrictive device within 7 day(s). 5.  Patient will ascend/descend 4 stairs with handrail(s) with minimal assistance/contact guard assist within 7 day(s). physical Therapy EVALUATION  Patient: Eusebia Wilkes (42 y.o. female)  Date: 1/19/2018  Primary Diagnosis: Gallstone/IV ABX  Jaundice  GALLSTONES  Common bile duct stone  Procedure(s) (LRB):   LAPAROSCOPIC CHOLECYSTECTOY, intraoperative cholangiogram CONVERT TO OPEN AT 1530h with common bile duct exploration under fluoroscopy, removal of common bile duct stones, t tube insertion (N/A)  ENDOSCOPIC RETROGRADE CHOLANGIOPANCREATOGRAPHY (ERCP), sphincterotomy, common bile duct stone extraction (N/A) 1 Day Post-Op   Precautions:  Fall    ASSESSMENT :  Based on the objective data described below, the patient presents with increased surgical abdominal pain, generalized weakness, impaired balance, and decreased activity tolerance all limiting patients functional mobility. She required significant encouragement to participate secondary to pain but tolerated mobility well. Expect she will continue to progress well with increased activity. Significant patient education provided on the importance of being up OOB and ambulating with nursing to assist with surgical healing/recovery. Encouraged her to be up OOB for all meals. Will continue to follow.       Patient will benefit from skilled intervention to address the above impairments. Patients rehabilitation potential is considered to be Good  Factors which may influence rehabilitation potential include:   [x]         None noted  []         Mental ability/status  []         Medical condition  []         Home/family situation and support systems  []         Safety awareness  []         Pain tolerance/management  []         Other:      PLAN :  Recommendations and Planned Interventions:  [x]           Bed Mobility Training             []    Neuromuscular Re-Education  [x]           Transfer Training                   []    Orthotic/Prosthetic Training  [x]           Gait Training                         []    Modalities  [x]           Therapeutic Exercises           []    Edema Management/Control  [x]           Therapeutic Activities            [x]    Patient and Family Training/Education  []           Other (comment):    Frequency/Duration: Patient will be followed by physical therapy  5 times a week to address goals. Discharge Recommendations: Home Health  Further Equipment Recommendations for Discharge: TBD; likely none     SUBJECTIVE:   Patient stated I just hurt.     OBJECTIVE DATA SUMMARY:   HISTORY:    Past Medical History:   Diagnosis Date    Asthma     Hypertension     PVC (premature ventricular contraction)      Past Surgical History:   Procedure Laterality Date    HX OTHER SURGICAL      Vaginal delivery    HX TONSIL AND ADENOIDECTOMY       Prior Level of Function/Home Situation: prior independence, works as a , still drives  Personal factors and/or comorbidities impacting plan of care:     Home Situation  Home Environment: Private residence  # Steps to Enter: 4  Rails to Enter: Yes  [employer] : Left (fence on right side with beam)  One/Two Story Residence: Two story  # of Interior Steps: 12  Interior Rails: Right  Living Alone: No  Support Systems: Family member(s)  Patient Expects to be Discharged to[employer] residence  Current DME Used/Available at Home: None    EXAMINATION/PRESENTATION/DECISION MAKING:   Critical Behavior:  Neurologic State: Alert, Drowsy  Orientation Level: Oriented X4  Cognition: Appropriate decision making, Appropriate for age attention/concentration, Appropriate safety awareness, Follows commands     Hearing: Auditory  Auditory Impairment: None  Hearing Aids/Status: Does not own  Range Of Motion:  AROM: Generally decreased, functional  Strength:    Strength: Generally decreased, functional  Tone & Sensation:   Tone: Normal  Sensation: Intact  Coordination:  Coordination: Within functional limits    Functional Mobility:  Bed Mobility:  Rolling: Moderate assistance;Minimum assistance;Assist x2; Additional time  Supine to Sit: Moderate assistance;Minimum assistance;Assist x2  Scooting: Contact guard assistance  Transfers:  Sit to Stand: Contact guard assistance  Stand to Sit: Contact guard assistance        Bed to Chair: Contact guard assistance; Additional time (use of RW for ambulation)        Balance:   Sitting: Impaired  Sitting - Static: Good (unsupported)  Sitting - Dynamic: Fair (occasional)  Standing: Impaired  Standing - Static: Good  Standing - Dynamic : Fair  Ambulation/Gait Training:  Distance (ft): 12 Feet (ft)  Assistive Device: Walker, rolling  Ambulation - Level of Assistance: Contact guard assistance  Gait Abnormalities: Decreased step clearance;Trunk sway increased  Base of Support: Widened  Speed/Mary: Pace decreased (<100 feet/min); Slow  Step Length: Left shortened;Right shortened      Patient slow but steady with use of RW      Functional Measure:  Barthel Index:    Bathin  Bladder: 10  Bowels: 10  Groomin  Dressin  Feedin  Mobility: 0  Stairs: 0  Toilet Use: 0  Transfer (Bed to Chair and Back): 10  Total: 40     Barthel and G-code impairment scale:  Percentage of impairment CH  0% CI  1-19% CJ  20-39% CK  40-59% CL  60-79% CM  80-99% CN  100%   Barthel Score 0-100 100 99-80 79-60 59-40 20-39 1-19   0   Barthel Score 0-20 20 17-19 13-16 9-12 5-8 1-4 0      The Barthel ADL Index: Guidelines  1. The index should be used as a record of what a patient does, not as a record of what a patient could do. 2. The main aim is to establish degree of independence from any help, physical or verbal, however minor and for whatever reason. 3. The need for supervision renders the patient not independent. 4. A patient's performance should be established using the best available evidence. Asking the patient, friends/relatives and nurses are the usual sources, but direct observation and common sense are also important. However direct testing is not needed. 5. Usually the patient's performance over the preceding 24-48 hours is important, but occasionally longer periods will be relevant. 6. Middle categories imply that the patient supplies over 50 per cent of the effort. 7. Use of aids to be independent is allowed. Zechariah Samayoa., Barthel, D.W. (0000). Functional evaluation: the Barthel Index. [address] [facility] (14)2. Bulmaro Worley na GERARD Arnett, Blanca Ortega., Angeline Avalos., [facility], [address] [facility] (1999). Measuring the change indisability after inpatient rehabilitation; comparison of the responsiveness of the Barthel Index and Functional [place] Measure. Journal of Neurology, Neurosurgery, and Psychiatry, 66(4), 992-065. Deborah Walter, N.J.A, PIA MathisJ.STIVEN, & Carolyn Cai MJANNETH. (2004.) Assessment of post-stroke quality of life in cost-effectiveness studies: The usefulness of the Barthel Index and the EuroQoL-5D. Quality of Life Research, 13, 274-47     G codes: In compliance with CMSs Claims Based Outcome Reporting, the following G-code set was chosen for this patient based on their primary functional limitation being treated:     The outcome measure chosen to determine the severity of the functional limitation was the Barthel Index with a score of 40/100 which was correlated with the impairment scale. ? Mobility - Walking and Moving Around:     - CURRENT STATUS: CK - 40%-59% impaired, limited or restricted    - GOAL STATUS: CJ - 20%-39% impaired, limited or restricted    - D/C STATUS:  ---------------To be determined---------------      Physical Therapy Evaluation Charge Determination   History Examination Presentation Decision-Making   LOW Complexity : Zero comorbidities / personal factors that will impact the outcome / POC LOW Complexity : 1-2 Standardized tests and measures addressing body structure, function, activity limitation and / or participation in recreation  LOW Complexity : Stable, uncomplicated  Other outcome measures Barthel Index  LOW       Based on the above components, the patient evaluation is determined to be of the following complexity level: LOW     Pain:   c/o pain throughout but tolerated well       Activity Tolerance:  Mildly hypotensive but stable   Please refer to the flowsheet for vital signs taken during this treatment. After treatment:   [x]         Patient left in no apparent distress sitting up in chair  []         Patient left in no apparent distress in bed  [x]         Call bell left within reach  [x]         Nursing notified  [x]         Caregiver present  []         Bed alarm activated    COMMUNICATION/EDUCATION:   The patients plan of care was discussed with: Occupational Therapist and Registered Nurse. [x]         Fall prevention education was provided and the patient/caregiver indicated understanding. [x]         Patient/family have participated as able in goal setting and plan of care. [x]         Patient/family agree to work toward stated goals and plan of care. []         Patient understands intent and goals of therapy, but is neutral about his/her participation. []         Patient is unable to participate in goal setting and plan of care.     Thank you for this referral.  Manjinder Cook, PT, DPT   Time Calculation: 31 mins

## 2018-01-19 NOTE — PERIOP NOTES
TRANSFER - OUT REPORT:    Verbal report given to Humza RN (name) on Troy Chery  being transferred to 2180 (unit) for routine post - op       Report consisted of patients Situation, Background, Assessment and   Recommendations(SBAR). Information from the following report(s) SBAR, OR Summary, Procedure Summary, Intake/Output, MAR and Med Rec Status was reviewed with the receiving nurse. Opportunity for questions and clarification was provided.       Patient transported with:   O2 @ 2 liters  Tech x2    Thomes Bound- Student Nurse

## 2018-01-19 NOTE — PROGRESS NOTES
Interdisciplinary Rounds were completed on this patient. Rounds included nursing, clinical care leader, pharmacy, and case management. Patient was resting in bed. Patient had the following concerns: drowsiness from pain medication. Goals for the day will include: adjust meds  and mobilize. Call placed to Dr. Fidencio Huerta to inquire about bowel regimen and other option for pain control. Dr. Fidencio Huerta is entering new orders now for tramadol, will hold on bowel regimen at this time.

## 2018-01-19 NOTE — PROGRESS NOTES
Gastroenterology Daily Progress Note (Dr. Andreina Escobar)   [address] [place] [address] [address] Date: 1/17/2018       Subjective:       Patient seen this am and claims her pain is well controlled on PCA pump. No flatus. No fevers. Discussed op findings with Dr. Jennifer Hansen. Current Facility-Administered Medications   Medication Dose Route Frequency    heparin (porcine) injection 5,000 Units  5,000 Units SubCUTAneous Q12H    morphine  mg / 30 mL   IntraVENous TITRATE    morphine 1-2 mg  1-2 mg IntraVENous Q4H PRN    lactated Ringers infusion  25 mL/hr IntraVENous CONTINUOUS    dextrose 5% - 0.45% NaCl with KCl 20 mEq/L infusion  125 mL/hr IntraVENous CONTINUOUS    ondansetron (ZOFRAN) injection 4 mg  4 mg IntraVENous Q4H PRN    sodium chloride (NS) flush 5-10 mL  5-10 mL IntraVENous Q8H    sodium chloride (NS) flush 5-10 mL  5-10 mL IntraVENous PRN    clindamycin (CLEOCIN) 600mg D5W 50mL IVPB (premix)  600 mg IntraVENous Q8H    losartan (COZAAR) tablet 50 mg  50 mg Oral DAILY    FLUoxetine (PROzac) capsule 60 mg  60 mg Oral DAILY        Objective:     Visit Vitals    /65 (BP 1 Location: Right arm, BP Patient Position: At rest)    Pulse 85    Temp 97.7 °F (36.5 °C)    Resp 20    Ht 5' 7\" (1.702 m)    Wt 135.6 kg (299 lb)    SpO2 95%    Breastfeeding No    BMI 46.83 kg/m2   Blood pressure 105/65, pulse 85, temperature 97.7 °F (36.5 °C), resp. rate 20, height 5' 7\" (1.702 m), weight 135.6 kg (299 lb), last menstrual period 12/30/2017, SpO2 95 %, not currently breastfeeding. 01/17 1901 - 01/19 0700  In: 3100 [P.O.:1000; I.V.:2100]  Out: 1520 [Urine:1100; Drains:220]      Intake/Output Summary (Last 24 hours) at 01/19/18 0723  Last data filed at 01/19/18 0300   Gross per 24 hour   Intake             3100 ml   Output             1520 ml   Net             1580 ml     Physical Exam:     General: Jaundice WF in NAD  Chest:  CTA, No rhonchi, rales or rubs.   Heart: S1, S2, RRR  GI: obese, BRANDI in RUQ with serosang fluid, T tube RUQ with dark bile, ND decreased BS    Labs:       Recent Results (from the past 24 hour(s))   HCG URINE, QL. - POC    Collection Time: 01/18/18 10:32 AM   Result Value Ref Range    Pregnancy test,urine (POC) NEGATIVE  NEG     METABOLIC PANEL, COMPREHENSIVE    Collection Time: 01/19/18  4:07 AM   Result Value Ref Range    Sodium 137 136 - 145 mmol/L    Potassium 4.3 3.5 - 5.1 mmol/L    Chloride 101 97 - 108 mmol/L    CO2 30 21 - 32 mmol/L    Anion gap 6 5 - 15 mmol/L    Glucose 109 (H) 65 - 100 mg/dL    BUN 15 6 - 20 MG/DL    Creatinine 0.79 0.55 - 1.02 MG/DL    BUN/Creatinine ratio 19 12 - 20      GFR est AA >60 >60 ml/min/1.73m2    GFR est non-AA >60 >60 ml/min/1.73m2    Calcium 8.4 (L) 8.5 - 10.1 MG/DL    Bilirubin, total 6.6 (H) 0.2 - 1.0 MG/DL    ALT (SGPT) 118 (H) 12 - 78 U/L    AST (SGOT) 107 (H) 15 - 37 U/L    Alk.  phosphatase 381 (H) 45 - 117 U/L    Protein, total 6.8 6.4 - 8.2 g/dL    Albumin 2.2 (L) 3.5 - 5.0 g/dL    Globulin 4.6 (H) 2.0 - 4.0 g/dL    A-G Ratio 0.5 (L) 1.1 - 2.2     CBC W/O DIFF    Collection Time: 01/19/18  4:07 AM   Result Value Ref Range    WBC 17.7 (H) 3.6 - 11.0 K/uL    RBC 3.94 3.80 - 5.20 M/uL    HGB 10.6 (L) 11.5 - 16.0 g/dL    HCT 31.8 (L) 35.0 - 47.0 %    MCV 80.7 80.0 - 99.0 FL    MCH 26.9 26.0 - 34.0 PG    MCHC 33.3 30.0 - 36.5 g/dL    RDW 16.9 (H) 11.5 - 14.5 %    PLATELET 289 (H) 467 - 400 K/uL     Recent Labs      01/19/18   0407  01/17/18   1415  01/16/18   1223   NA  137  136  138   K  4.3  3.1*  3.8   CL  101  100  95*   CO2  30  27  28   BUN  15  7  6   CREA  0.79  0.47*  0.47*   GLU  109*  108*  94   CA  8.4*  8.5  8.8     Recent Labs      01/17/18   1415   INR  1.2*   PTP  11.9*   APTT  30.3     Recent Labs      01/19/18   0407  01/17/18   1415  01/16/18   1223   SGOT  107*  90*  82*   AP  381*  328*  306*   TP  6.8  6.6  6.5   ALB  2.2*  2.3*  3.2*   GLOB  4.6*  4.3*   --    LPSE   --    --   11* Impression:    Biliary obstruction secondary to choledocholithiasis and also Mirizzi's syndrome  Acute cholangitis  Acute cholecystitis         Plan:  Complex cholecystectomy for which patient has T tube left in place and there was Mirizzi's found operatively and converted to open tesha after discussion with Dr. Mk Mccall. Her CBD is cleared and there was evidence of acute cholangitis at ERCP with sphincterotomy and stone extraction.   -will follow post operatively with surgery for now  -continue PCA and antibiotics         Bridgett Mueller MD    1/19/2018  [facility] [address], [address] [place] [address] [place]: [phone]

## 2018-01-19 NOTE — PROGRESS NOTES
SURGERY PROGRESS NOTE      Admit Date: 2018    POD 1 Day Post-Op    Procedure: Procedure(s):   LAPAROSCOPIC CHOLECYSTECTOY, intraoperative cholangiogram CONVERT TO OPEN AT 1530h with common bile duct exploration under fluoroscopy, removal of common bile duct stones, t tube insertion  ENDOSCOPIC RETROGRADE CHOLANGIOPANCREATOGRAPHY (ERCP), sphincterotomy, common bile duct stone extraction      Subjective:     Complaining of incisional pain. PCA is working but making her groggy. No flatus. Tolerating clears. Objective:     Visit Vitals    /54 (BP 1 Location: Left arm, BP Patient Position: At rest)    Pulse 89    Temp 96.7 °F (35.9 °C)    Resp 17    Ht 5' 7\" (1.702 m)    Wt 135.6 kg (299 lb)    LMP 2017    SpO2 96%    Breastfeeding No    BMI 46.83 kg/m2        Temp (24hrs), Av.1 °F (36.7 °C), Min:96.7 °F (35.9 °C), Max:98.8 °F (37.1 °C)      Physical Exam:     Abdomen:  Soft. Non-distended. Incision C/D/I.         Lab Results  Component Value Date/Time   WBC 17.7 2018 04:07 AM   HGB 10.6 2018 04:07 AM   HCT 31.8 2018 04:07 AM   PLATELET 103  04:07 AM   MCV 80.7 2018 04:07 AM     Lab Results  Component Value Date/Time   GFR est non-AA >60 2018 04:07 AM   GFR est AA >60 2018 04:07 AM   Creatinine 0.79 2018 04:07 AM   BUN 15 2018 04:07 AM   Sodium 137 2018 04:07 AM   Potassium 4.3 2018 04:07 AM   Chloride 101 2018 04:07 AM   CO2 30 2018 04:07 AM       Assessment:     Active Problems:    Jaundice (2018)      Common bile duct stone (2018)        Plan/Recommendations/Medical Decision Making:     Decrease PCA  Ambulate  PT/OT  Continue drain  Continue antibiotics

## 2018-01-20 PROCEDURE — 74011000250 HC RX REV CODE- 250: Performed by: SURGERY

## 2018-01-20 PROCEDURE — 74011000258 HC RX REV CODE- 258: Performed by: SURGERY

## 2018-01-20 PROCEDURE — 77010033678 HC OXYGEN DAILY

## 2018-01-20 PROCEDURE — 74011250637 HC RX REV CODE- 250/637: Performed by: SURGERY

## 2018-01-20 PROCEDURE — 65270000029 HC RM PRIVATE

## 2018-01-20 PROCEDURE — 74011250636 HC RX REV CODE- 250/636: Performed by: SURGERY

## 2018-01-20 RX ADMIN — LOSARTAN POTASSIUM 50 MG: 50 TABLET, FILM COATED ORAL at 09:02

## 2018-01-20 RX ADMIN — FLUOXETINE 60 MG: 20 CAPSULE ORAL at 09:02

## 2018-01-20 RX ADMIN — AZTREONAM 1 G: 1 INJECTION, POWDER, LYOPHILIZED, FOR SOLUTION INTRAMUSCULAR; INTRAVENOUS at 20:13

## 2018-01-20 RX ADMIN — AZTREONAM 1 G: 1 INJECTION, POWDER, LYOPHILIZED, FOR SOLUTION INTRAMUSCULAR; INTRAVENOUS at 11:17

## 2018-01-20 RX ADMIN — Medication 1 LOZENGE: at 01:31

## 2018-01-20 RX ADMIN — Medication 10 ML: at 05:42

## 2018-01-20 RX ADMIN — AZTREONAM 1 G: 1 INJECTION, POWDER, LYOPHILIZED, FOR SOLUTION INTRAMUSCULAR; INTRAVENOUS at 05:41

## 2018-01-20 RX ADMIN — HEPARIN SODIUM 5000 UNITS: 5000 INJECTION, SOLUTION INTRAVENOUS; SUBCUTANEOUS at 20:13

## 2018-01-20 RX ADMIN — METRONIDAZOLE 500 MG: 500 INJECTION, SOLUTION INTRAVENOUS at 01:28

## 2018-01-20 RX ADMIN — METRONIDAZOLE 500 MG: 500 INJECTION, SOLUTION INTRAVENOUS at 19:12

## 2018-01-20 RX ADMIN — METRONIDAZOLE 500 MG: 500 INJECTION, SOLUTION INTRAVENOUS at 09:02

## 2018-01-20 RX ADMIN — HEPARIN SODIUM 5000 UNITS: 5000 INJECTION, SOLUTION INTRAVENOUS; SUBCUTANEOUS at 09:02

## 2018-01-20 RX ADMIN — Medication 10 ML: at 01:31

## 2018-01-20 RX ADMIN — DEXTROSE MONOHYDRATE, SODIUM CHLORIDE, AND POTASSIUM CHLORIDE 125 ML/HR: 50; 4.5; 1.49 INJECTION, SOLUTION INTRAVENOUS at 15:05

## 2018-01-20 NOTE — ROUTINE PROCESS
General Surgery End of Shift Nursing Note    Bedside shift change report given to Fredy (oncoming nurse) by Georgette Almendarez (offgoing nurse). Report included the following information SBAR, Kardex, OR Summary, Procedure Summary, Intake/Output, MAR and Recent Results. Shift worked:   7-730   Summary of shift:    Pt was very drowsy at beginning of shift. Using PCA for pain.  Decreased dosage from 1mg to .5mg. Pt less drowsy afterwards. Pt c/o pain in back and abd. encourgaged PCA and advised getting up would help with the gas. PT worked with pt to get to chair. Pt came on her menstral cycle during shift. Pt didn't eat breakfast at all r/t pain. 25% of lunch and 50% of dinner. Issues for physician to address:   none     Number times ambulated in hallway past shift: 0    Number of times OOB to chair past shift: 1    Pain Management:  Current medication: see MAR  Patient states pain is manageable on current pain medication: YES    GI:    Current diet:  DIET CLEAR LIQUID    Tolerating current diet: YES  Passing flatus: YES  Last Bowel Movement: several days ago   Appearance: not observed    Respiratory:    Incentive Spirometer at bedside: YES  Patient instructed on use: YES    Patient Safety:    Falls Score: 3  Bed Alarm On? No  Sitter?  No    Sandra Pringle

## 2018-01-20 NOTE — PROGRESS NOTES
1900--bedside report received from spenser hooker.pt resting in bed oriented x 4. PCA controlling pain. Call bell in reach. Assessment as noted. 2200--pt reports having up to [facility] with standby assist, states \"i having gas pain, it just needs to come through, also reports sore throat (MAR), call bell in reach. 0400--pt resting comfortably no distress noted. 0600--drains emptied, see doc flowsheet. pt has no complaints at this time. 0630--navin pharmacist confirmed that morpine PCA new syringe can be stored in pt  in pyxis but to make sure oncoming RN is aware so new syringe will not be requested.   mrpine PCA syringe placed in pyxis in pt     0700--bedside report given to spenser macario who is assuming care of pt

## 2018-01-20 NOTE — PROGRESS NOTES
Gastroenterology Daily Progress Note (Dr. Gurvinder Juares)   [facility]    Admit Date: 1/17/2018       Subjective:       Gas pain. Decreased PCA due to somnolence. Mobilized some with PT to chair. No fever. Drain output noted. Current Facility-Administered Medications   Medication Dose Route Frequency    metroNIDAZOLE (FLAGYL) IVPB premix 500 mg  500 mg IntraVENous Q8H    aztreonam (AZACTAM) 1 g in 0.9% sodium chloride (MBP/ADV) 100 mL  1 g IntraVENous Q8H    traMADol (ULTRAM) tablet 50 mg  50 mg Oral Q6H PRN    benzocaine-menthol (CHLORASEPTIC MAX) lozenge 1 Lozenge  1 Lozenge Oral Q2H PRN    heparin (porcine) injection 5,000 Units  5,000 Units SubCUTAneous Q12H    morphine  mg / 30 mL   IntraVENous TITRATE    morphine 1-2 mg  1-2 mg IntraVENous Q4H PRN    lactated Ringers infusion  25 mL/hr IntraVENous CONTINUOUS    dextrose 5% - 0.45% NaCl with KCl 20 mEq/L infusion  125 mL/hr IntraVENous CONTINUOUS    ondansetron (ZOFRAN) injection 4 mg  4 mg IntraVENous Q4H PRN    sodium chloride (NS) flush 5-10 mL  5-10 mL IntraVENous Q8H    sodium chloride (NS) flush 5-10 mL  5-10 mL IntraVENous PRN    losartan (COZAAR) tablet 50 mg  50 mg Oral DAILY    FLUoxetine (PROzac) capsule 60 mg  60 mg Oral DAILY        Objective:     Visit Vitals    /76 (BP 1 Location: Right arm, BP Patient Position: At rest)    Pulse 90    Temp 97.7 °F (36.5 °C)    Resp 18    Ht 5' 7\" (1.702 m)    Wt 135.6 kg (299 lb)    SpO2 93%    Breastfeeding No    BMI 46.83 kg/m2   Blood pressure 123/76, pulse 90, temperature 97.7 °F (36.5 °C), resp. rate 18, height 5' 7\" (1.702 m), weight 135.6 kg (299 lb), last menstrual period 12/30/2017, SpO2 93 %, not currently breastfeeding. 01/18 1901 - 01/20 0700  In: 2900 [P.O.:1300;  I.V.:1600]  Out: 1535 [Urine:750; Drains:785]      Intake/Output Summary (Last 24 hours) at 01/20/18 0872  Last data filed at 01/20/18 0547   Gross per 24 hour Intake              300 ml   Output              605 ml   Net             -305 ml     Physical Exam:     General: Jaundice WF in NAD  Chest:  CTA, No rhonchi, rales or rubs. Heart: S1, S2, RRR  GI: obese, BRANDI in RUQ with serosang fluid, T tube RUQ with dark bile, ND decreased BS    Labs:       No results found for this or any previous visit (from the past 24 hour(s)). Recent Labs      01/19/18   0407  01/17/18   1415   NA  137  136   K  4.3  3.1*   CL  101  100   CO2  30  27   BUN  15  7   CREA  0.79  0.47*   GLU  109*  108*   CA  8.4*  8.5     Recent Labs      01/17/18   1415   INR  1.2*   PTP  11.9*   APTT  30.3     Recent Labs      01/19/18   0407  01/17/18   1415   SGOT  107*  90*   AP  381*  328*   TP  6.8  6.6   ALB  2.2*  2.3*   GLOB  4.6*  4.3*         Impression:    Biliary obstruction secondary to choledocholithiasis   Acute cholangitis  Acute cholecystitis         Plan:  Severe cholecystitis requiring lap converted to open tesha as well as common bile duct exploration and also ERCP to clear the CBD of multiple stones as well as cholangitis.   -post operatively doing well with drop in PCA dose  -tolerating clear liquids  -T bili was 6 range yesterday down from previous  -afebrile and mobilizing  -will see again on Monday, call with questions         J Luis Palomo MD    1/20/2018  [address], [address] [place] [address]  [phone] [id number]  [address] [place] [address] [place]: [phone]

## 2018-01-20 NOTE — ROUTINE PROCESS
General Surgery End of Shift Nursing Note    Bedside shift change report given to [facility] (oncoming nurse) by Dalila Jones (offgoing nurse). Report included the following information SBAR, Kardex, OR Summary, Procedure Summary, Intake/Output, MAR and Recent Results. Shift worked:   7-030   Summary of shift:   Explained to pt this morning about the importance of ambulating to help release gas. Pt agreed to use bathroom vs BSC. Partial hygiene assist and up to chair. Diet much better today and pain is managed by PCA   Issues for physician to address:   none     Number times ambulated in hallway past shift: 0    Number of times OOB to chair past shift: 1    Pain Management:  Current medication: see MAR  Patient states pain is manageable on current pain medication: YES    GI:    Current diet:  DIET CLEAR LIQUID    Tolerating current diet: YES  Passing flatus: YES  Last Bowel Movement: several days ago   Appearance: not observed    Respiratory:    Incentive Spirometer at bedside: YES  Patient instructed on use: YES    Patient Safety:    Falls Score: 3  Bed Alarm On? No  Sitter?  No    Sandra Pringle

## 2018-01-20 NOTE — PROGRESS NOTES
Admit Date: 2018    POD 2 Days Post-Op    Procedure:  Procedure(s):   LAPAROSCOPIC CHOLECYSTECTOY, intraoperative cholangiogram CONVERT TO OPEN AT 1530h with common bile duct exploration under fluoroscopy, removal of common bile duct stones, t tube insertion  ENDOSCOPIC RETROGRADE CHOLANGIOPANCREATOGRAPHY (ERCP), sphincterotomy, common bile duct stone extraction    Subjective:     Patient has no new complaints. No flatus yet    Objective:     Blood pressure 124/88, pulse 87, temperature 97.8 °F (36.6 °C), resp. rate 17, height 5' 7\" (1.702 m), weight 299 lb (135.6 kg), last menstrual period 2017, SpO2 96 %, not currently breastfeeding. Temp (24hrs), Av.1 °F (36.7 °C), Min:97.7 °F (36.5 °C), Max:98.9 °F (37.2 °C)      Physical Exam:  GENERAL: alert, cooperative, no distress, appears stated age, LUNG: nl effort, HEART: regular rate and rhythm, ABDOMEN: t tube, daryl, decreased BS, EXTREMITIES:  extremities normal, atraumatic, no cyanosis or edema    Labs: No results found for this or any previous visit (from the past 24 hour(s)).     Data Review reviewed  I & O    Assessment:     Active Problems:    Jaundice (2018)      Common bile duct stone (2018)        Plan/Recommendations/Medical Decision Making:     Continue present treatment  await bowel function    Phineas Barrier  [id number] [facility]

## 2018-01-21 LAB
ALBUMIN SERPL-MCNC: 1.9 G/DL (ref 3.5–5)
ALBUMIN/GLOB SERPL: 0.4 {RATIO} (ref 1.1–2.2)
ALP SERPL-CCNC: 241 U/L (ref 45–117)
ALT SERPL-CCNC: 55 U/L (ref 12–78)
ANION GAP SERPL CALC-SCNC: 7 MMOL/L (ref 5–15)
AST SERPL-CCNC: 35 U/L (ref 15–37)
BILIRUB SERPL-MCNC: 4.1 MG/DL (ref 0.2–1)
BUN SERPL-MCNC: 4 MG/DL (ref 6–20)
BUN/CREAT SERPL: 13 (ref 12–20)
CALCIUM SERPL-MCNC: 8.3 MG/DL (ref 8.5–10.1)
CHLORIDE SERPL-SCNC: 102 MMOL/L (ref 97–108)
CO2 SERPL-SCNC: 26 MMOL/L (ref 21–32)
CREAT SERPL-MCNC: 0.3 MG/DL (ref 0.55–1.02)
ERYTHROCYTE [DISTWIDTH] IN BLOOD BY AUTOMATED COUNT: 17.4 % (ref 11.5–14.5)
GLOBULIN SER CALC-MCNC: 4.4 G/DL (ref 2–4)
GLUCOSE SERPL-MCNC: 102 MG/DL (ref 65–100)
HCT VFR BLD AUTO: 27.6 % (ref 35–47)
HGB BLD-MCNC: 8.5 G/DL (ref 11.5–16)
MCH RBC QN AUTO: 24.8 PG (ref 26–34)
MCHC RBC AUTO-ENTMCNC: 30.8 G/DL (ref 30–36.5)
MCV RBC AUTO: 80.5 FL (ref 80–99)
PLATELET # BLD AUTO: 453 K/UL (ref 150–400)
POTASSIUM SERPL-SCNC: 3.8 MMOL/L (ref 3.5–5.1)
PROT SERPL-MCNC: 6.3 G/DL (ref 6.4–8.2)
RBC # BLD AUTO: 3.43 M/UL (ref 3.8–5.2)
SODIUM SERPL-SCNC: 135 MMOL/L (ref 136–145)
WBC # BLD AUTO: 11.6 K/UL (ref 3.6–11)

## 2018-01-21 PROCEDURE — 85027 COMPLETE CBC AUTOMATED: CPT | Performed by: FAMILY MEDICINE

## 2018-01-21 PROCEDURE — 74011250637 HC RX REV CODE- 250/637: Performed by: SURGERY

## 2018-01-21 PROCEDURE — 80053 COMPREHEN METABOLIC PANEL: CPT | Performed by: FAMILY MEDICINE

## 2018-01-21 PROCEDURE — 74011250636 HC RX REV CODE- 250/636: Performed by: SURGERY

## 2018-01-21 PROCEDURE — 74011000258 HC RX REV CODE- 258: Performed by: SURGERY

## 2018-01-21 PROCEDURE — 36415 COLL VENOUS BLD VENIPUNCTURE: CPT | Performed by: FAMILY MEDICINE

## 2018-01-21 PROCEDURE — 65270000029 HC RM PRIVATE

## 2018-01-21 PROCEDURE — 74011000250 HC RX REV CODE- 250: Performed by: SURGERY

## 2018-01-21 RX ADMIN — AZTREONAM 1 G: 1 INJECTION, POWDER, LYOPHILIZED, FOR SOLUTION INTRAMUSCULAR; INTRAVENOUS at 11:00

## 2018-01-21 RX ADMIN — METRONIDAZOLE 500 MG: 500 INJECTION, SOLUTION INTRAVENOUS at 01:53

## 2018-01-21 RX ADMIN — Medication 10 ML: at 21:45

## 2018-01-21 RX ADMIN — METRONIDAZOLE 500 MG: 500 INJECTION, SOLUTION INTRAVENOUS at 10:00

## 2018-01-21 RX ADMIN — AZTREONAM 1 G: 1 INJECTION, POWDER, LYOPHILIZED, FOR SOLUTION INTRAMUSCULAR; INTRAVENOUS at 03:32

## 2018-01-21 RX ADMIN — DEXTROSE MONOHYDRATE, SODIUM CHLORIDE, AND POTASSIUM CHLORIDE 125 ML/HR: 50; 4.5; 1.49 INJECTION, SOLUTION INTRAVENOUS at 00:23

## 2018-01-21 RX ADMIN — METRONIDAZOLE 500 MG: 500 INJECTION, SOLUTION INTRAVENOUS at 19:01

## 2018-01-21 RX ADMIN — AZTREONAM 1 G: 1 INJECTION, POWDER, LYOPHILIZED, FOR SOLUTION INTRAMUSCULAR; INTRAVENOUS at 22:42

## 2018-01-21 RX ADMIN — Medication: at 12:28

## 2018-01-21 RX ADMIN — HEPARIN SODIUM 5000 UNITS: 5000 INJECTION, SOLUTION INTRAVENOUS; SUBCUTANEOUS at 21:45

## 2018-01-21 RX ADMIN — HEPARIN SODIUM 5000 UNITS: 5000 INJECTION, SOLUTION INTRAVENOUS; SUBCUTANEOUS at 09:00

## 2018-01-21 RX ADMIN — LOSARTAN POTASSIUM 50 MG: 50 TABLET, FILM COATED ORAL at 09:00

## 2018-01-21 RX ADMIN — FLUOXETINE 60 MG: 20 CAPSULE ORAL at 09:00

## 2018-01-21 NOTE — PROGRESS NOTES
General Surgery End of Shift Nursing Note    Bedside shift change report given to Teofilo Fowler RN(oncoming nurse) by Jonathan Aburto RN (offgoing nurse). Report included the following information SBAR, Kardex, Procedure Summary, MAR and Recent Results. Patient ambulated to and from bathroom x 1 assistance. On Morphine PCA; well controlled. Fully oriented. No sedation noted. Passed gas this am at 0500.      Samantha Gallo RN

## 2018-01-21 NOTE — PROGRESS NOTES
Admit Date: 2018    POD 3 Days Post-Op    Procedure:  Procedure(s):   LAPAROSCOPIC CHOLECYSTECTOY, intraoperative cholangiogram CONVERT TO OPEN AT 1530h with common bile duct exploration under fluoroscopy, removal of common bile duct stones, t tube insertion  ENDOSCOPIC RETROGRADE CHOLANGIOPANCREATOGRAPHY (ERCP), sphincterotomy, common bile duct stone extraction    Subjective:     Patient has no new complaints. Passing flatus    Objective:     Blood pressure 138/80, pulse 82, temperature 98.7 °F (37.1 °C), resp. rate 17, height 5' 7\" (1.702 m), weight 299 lb (135.6 kg), last menstrual period 2017, SpO2 92 %, not currently breastfeeding.     Temp (24hrs), Av.3 °F (36.8 °C), Min:97.5 °F (36.4 °C), Max:98.9 °F (37.2 °C)      Physical Exam:  GENERAL: alert, cooperative, no distress, appears stated age, LUNG: nl effort, HEART: regular rate and rhythm, ABDOMEN: daryl, t tube, EXTREMITIES:  extremities normal, atraumatic, no cyanosis or edema    Labs:   Recent Results (from the past 24 hour(s))   CBC W/O DIFF    Collection Time: 18  3:42 AM   Result Value Ref Range    WBC 11.6 (H) 3.6 - 11.0 K/uL    RBC 3.43 (L) 3.80 - 5.20 M/uL    HGB 8.5 (L) 11.5 - 16.0 g/dL    HCT 27.6 (L) 35.0 - 47.0 %    MCV 80.5 80.0 - 99.0 FL    MCH 24.8 (L) 26.0 - 34.0 PG    MCHC 30.8 30.0 - 36.5 g/dL    RDW 17.4 (H) 11.5 - 14.5 %    PLATELET 160 (H) 328 - 047 K/uL   METABOLIC PANEL, COMPREHENSIVE    Collection Time: 18  3:42 AM   Result Value Ref Range    Sodium 135 (L) 136 - 145 mmol/L    Potassium 3.8 3.5 - 5.1 mmol/L    Chloride 102 97 - 108 mmol/L    CO2 26 21 - 32 mmol/L    Anion gap 7 5 - 15 mmol/L    Glucose 102 (H) 65 - 100 mg/dL    BUN 4 (L) 6 - 20 MG/DL    Creatinine 0.30 (L) 0.55 - 1.02 MG/DL    BUN/Creatinine ratio 13 12 - 20      GFR est AA >60 >60 ml/min/1.73m2    GFR est non-AA >60 >60 ml/min/1.73m2    Calcium 8.3 (L) 8.5 - 10.1 MG/DL    Bilirubin, total 4.1 (H) 0.2 - 1.0 MG/DL    ALT (SGPT) 55 12 - 78 U/L AST (SGOT) 35 15 - 37 U/L    Alk.  phosphatase 241 (H) 45 - 117 U/L    Protein, total 6.3 (L) 6.4 - 8.2 g/dL    Albumin 1.9 (L) 3.5 - 5.0 g/dL    Globulin 4.4 (H) 2.0 - 4.0 g/dL    A-G Ratio 0.4 (L) 1.1 - 2.2         Data Review reviewed  I & O and lab  WBC trending down nicely  Bili down  Assessment:     Active Problems:    Jaundice (1/17/2018)      Common bile duct stone (1/18/2018)        Plan/Recommendations/Medical Decision Making:     Continue present treatment  Diet  full liquid  decrease iv fluids   Dr Nanette Winter returns in AM to resume management    Ishmael Medel MD, [facility]

## 2018-01-21 NOTE — PROGRESS NOTES
Problem: Pain  Goal: *Control of Pain  Outcome: Progressing Towards Goal  Assess apin Q4h & prn. Encourage use of PCA. Rating pain \"4\" out of \"10\" on scale of \"0-10\".

## 2018-01-22 LAB
ALBUMIN SERPL-MCNC: 1.9 G/DL (ref 3.5–5)
ALBUMIN/GLOB SERPL: 0.4 {RATIO} (ref 1.1–2.2)
ALP SERPL-CCNC: 238 U/L (ref 45–117)
ALT SERPL-CCNC: 67 U/L (ref 12–78)
ANION GAP SERPL CALC-SCNC: 6 MMOL/L (ref 5–15)
AST SERPL-CCNC: 63 U/L (ref 15–37)
BILIRUB SERPL-MCNC: 3.5 MG/DL (ref 0.2–1)
BUN SERPL-MCNC: 4 MG/DL (ref 6–20)
BUN/CREAT SERPL: 11 (ref 12–20)
CALCIUM SERPL-MCNC: 8.6 MG/DL (ref 8.5–10.1)
CHLORIDE SERPL-SCNC: 102 MMOL/L (ref 97–108)
CO2 SERPL-SCNC: 29 MMOL/L (ref 21–32)
CREAT SERPL-MCNC: 0.37 MG/DL (ref 0.55–1.02)
ERYTHROCYTE [DISTWIDTH] IN BLOOD BY AUTOMATED COUNT: 17.1 % (ref 11.5–14.5)
GLOBULIN SER CALC-MCNC: 4.8 G/DL (ref 2–4)
GLUCOSE SERPL-MCNC: 89 MG/DL (ref 65–100)
HCT VFR BLD AUTO: 28.8 % (ref 35–47)
HGB BLD-MCNC: 9.3 G/DL (ref 11.5–16)
MCH RBC QN AUTO: 26.3 PG (ref 26–34)
MCHC RBC AUTO-ENTMCNC: 32.3 G/DL (ref 30–36.5)
MCV RBC AUTO: 81.4 FL (ref 80–99)
PLATELET # BLD AUTO: 522 K/UL (ref 150–400)
POTASSIUM SERPL-SCNC: 4 MMOL/L (ref 3.5–5.1)
PROT SERPL-MCNC: 6.7 G/DL (ref 6.4–8.2)
RBC # BLD AUTO: 3.54 M/UL (ref 3.8–5.2)
SODIUM SERPL-SCNC: 137 MMOL/L (ref 136–145)
WBC # BLD AUTO: 9.5 K/UL (ref 3.6–11)

## 2018-01-22 PROCEDURE — 74011000258 HC RX REV CODE- 258: Performed by: SURGERY

## 2018-01-22 PROCEDURE — 74011000250 HC RX REV CODE- 250: Performed by: SURGERY

## 2018-01-22 PROCEDURE — 74011250637 HC RX REV CODE- 250/637: Performed by: SURGERY

## 2018-01-22 PROCEDURE — 36415 COLL VENOUS BLD VENIPUNCTURE: CPT | Performed by: SURGERY

## 2018-01-22 PROCEDURE — 65270000029 HC RM PRIVATE

## 2018-01-22 PROCEDURE — 80053 COMPREHEN METABOLIC PANEL: CPT | Performed by: SURGERY

## 2018-01-22 PROCEDURE — 74011250636 HC RX REV CODE- 250/636: Performed by: FAMILY MEDICINE

## 2018-01-22 PROCEDURE — 85027 COMPLETE CBC AUTOMATED: CPT | Performed by: SURGERY

## 2018-01-22 PROCEDURE — 74011250636 HC RX REV CODE- 250/636: Performed by: SURGERY

## 2018-01-22 RX ORDER — DOCUSATE SODIUM 100 MG/1
100 CAPSULE, LIQUID FILLED ORAL 2 TIMES DAILY
Status: DISCONTINUED | OUTPATIENT
Start: 2018-01-22 | End: 2018-01-23

## 2018-01-22 RX ORDER — MORPHINE SULFATE 4 MG/ML
1 INJECTION INTRAVENOUS
Status: DISCONTINUED | OUTPATIENT
Start: 2018-01-22 | End: 2018-01-23 | Stop reason: HOSPADM

## 2018-01-22 RX ADMIN — TRAMADOL HYDROCHLORIDE 50 MG: 50 TABLET, FILM COATED ORAL at 15:50

## 2018-01-22 RX ADMIN — DOCUSATE SODIUM 100 MG: 100 CAPSULE, LIQUID FILLED ORAL at 17:52

## 2018-01-22 RX ADMIN — Medication 10 ML: at 05:52

## 2018-01-22 RX ADMIN — FLUOXETINE 60 MG: 20 CAPSULE ORAL at 09:08

## 2018-01-22 RX ADMIN — LOSARTAN POTASSIUM 50 MG: 50 TABLET, FILM COATED ORAL at 09:14

## 2018-01-22 RX ADMIN — AZTREONAM 1 G: 1 INJECTION, POWDER, LYOPHILIZED, FOR SOLUTION INTRAMUSCULAR; INTRAVENOUS at 05:51

## 2018-01-22 RX ADMIN — Medication 10 ML: at 15:33

## 2018-01-22 RX ADMIN — METRONIDAZOLE 500 MG: 500 INJECTION, SOLUTION INTRAVENOUS at 17:52

## 2018-01-22 RX ADMIN — TRAMADOL HYDROCHLORIDE 50 MG: 50 TABLET, FILM COATED ORAL at 22:06

## 2018-01-22 RX ADMIN — DOCUSATE SODIUM 100 MG: 100 CAPSULE, LIQUID FILLED ORAL at 09:14

## 2018-01-22 RX ADMIN — METRONIDAZOLE 500 MG: 500 INJECTION, SOLUTION INTRAVENOUS at 09:08

## 2018-01-22 RX ADMIN — METRONIDAZOLE 500 MG: 500 INJECTION, SOLUTION INTRAVENOUS at 01:35

## 2018-01-22 RX ADMIN — MORPHINE SULFATE 1 MG: 4 INJECTION INTRAVENOUS at 12:46

## 2018-01-22 RX ADMIN — AZTREONAM 1 G: 1 INJECTION, POWDER, LYOPHILIZED, FOR SOLUTION INTRAMUSCULAR; INTRAVENOUS at 22:06

## 2018-01-22 RX ADMIN — DEXTROSE MONOHYDRATE, SODIUM CHLORIDE, AND POTASSIUM CHLORIDE 75 ML/HR: 50; 4.5; 1.49 INJECTION, SOLUTION INTRAVENOUS at 03:21

## 2018-01-22 RX ADMIN — HEPARIN SODIUM 5000 UNITS: 5000 INJECTION, SOLUTION INTRAVENOUS; SUBCUTANEOUS at 09:08

## 2018-01-22 RX ADMIN — HEPARIN SODIUM 5000 UNITS: 5000 INJECTION, SOLUTION INTRAVENOUS; SUBCUTANEOUS at 22:07

## 2018-01-22 RX ADMIN — AZTREONAM 1 G: 1 INJECTION, POWDER, LYOPHILIZED, FOR SOLUTION INTRAMUSCULAR; INTRAVENOUS at 15:32

## 2018-01-22 NOTE — ROUTINE PROCESS
General Surgery End of Shift Nursing Note    Bedside shift change report given to Cuate Asher (oncoming nurse) by Hitesh Zamudio (offgoing nurse). Report included the following information SBAR, Kardex, OR Summary, Procedure Summary, Intake/Output and MAR. Shift worked:   3-11   Summary of shift:    Pt c/o lower back pain. PCA for pain management. Assisted to bathroom twice. Family came to visit. Issues for physician to address:   none     Number times ambulated in hallway past shift: 0    Number of times OOB to chair past shift: 0    Pain Management:  Current medication: see MAR  Patient states pain is manageable on current pain medication: YES    GI:    Current diet:  DIET FULL LIQUID    Tolerating current diet: YES  Passing flatus: YES  Last Bowel Movement: several days ago   Appearance: not observed    Respiratory:    Incentive Spirometer at bedside: YES  Patient instructed on use: YES    Patient Safety:    Falls Score: 2  Bed Alarm On? No  Sitter?  No    Sandra Pringle

## 2018-01-22 NOTE — PROGRESS NOTES
Physical Therapy  1/22/2018    1400: Cleared by RN to work with patient. Pt reports she just received morphine and she can't do anything right now as she is too drowsy. Pt declining therapy despite encouragement. Stated she would get up with staff once she is feeling back to normal. Plan to discharge home with HHPT remains appropriate. Will follow up tomorrow.      Thank Jose Bonilla, PT, DPT

## 2018-01-22 NOTE — PROGRESS NOTES
SURGERY PROGRESS NOTE      Admit Date: 2018    POD 4 Days Post-Op    Procedure: Procedure(s):   LAPAROSCOPIC CHOLECYSTECTOY, intraoperative cholangiogram CONVERT TO OPEN AT 1530h with common bile duct exploration under fluoroscopy, removal of common bile duct stones, t tube insertion  ENDOSCOPIC RETROGRADE CHOLANGIOPANCREATOGRAPHY (ERCP), sphincterotomy, common bile duct stone extraction      Subjective:     Feeling better overall. Tolerating full liquid diet. Objective:     Visit Vitals    /73    Pulse 75    Temp 97.6 °F (36.4 °C)    Resp 20    Ht 5' 7\" (1.702 m)    Wt 135.6 kg (299 lb)    LMP 2017    SpO2 96%    Breastfeeding No    BMI 46.83 kg/m2        Temp (24hrs), Av.1 °F (36.7 °C), Min:97.6 °F (36.4 °C), Max:98.7 °F (37.1 °C)      Physical Exam:     Abdomen:  Soft. Non-distended. JN dressing intact and dry. BRANDI with serosanguinous drainage. T-tube draining well. Lab Results  Component Value Date/Time   WBC 9.5 2018 07:10 AM   HGB 9.3 2018 07:10 AM   HCT 28.8 2018 07:10 AM   PLATELET 331  07:10 AM   MCV 81.4 2018 07:10 AM     Lab Results  Component Value Date/Time   GFR est non-AA >60 2018 07:10 AM   GFR est AA >60 2018 07:10 AM   Creatinine 0.37 2018 07:10 AM   BUN 4 2018 07:10 AM   Sodium 137 2018 07:10 AM   Potassium 4.0 2018 07:10 AM   Chloride 102 2018 07:10 AM   CO2 29 2018 07:10 AM       Assessment:     Active Problems:    Jaundice (2018)      Common bile duct stone (2018)    LFTs coming down nicely  Leukocytosis resolved    Plan/Recommendations/Medical Decision Making:     Advance to regular diet  D/c PCA  Ambulate  Discharge planning. Home health.   Plan for discharge in AM.

## 2018-01-22 NOTE — PROGRESS NOTES
General Surgery End of Shift Nursing Note    Bedside shift change report given to Koko Moreau RN (oncoming nurse) by Tolu Rivera RN (offgoing nurse). Report included the following information SBAR, Kardex, Intake/Output, MAR, Accordion and Recent Results. Shift worked:   11p-7a   Summary of shift:    Patient states pain is much better; minimal use of PCA overnight; BRANDI drain with minimal output, moderate output from [place] drain   Issues for physician to address:        Number times ambulated in hallway past shift: 0    Number of times OOB to chair past shift: 0    Pain Management:  Current medication: Morphine PCA  Patient states pain is manageable on current pain medication: YES    GI:    Current diet:  DIET FULL LIQUID    Tolerating current diet: YES  Passing flatus: YES  Last Bowel Movement: 1/17/18   Appearance:     Respiratory:    Incentive Spirometer at bedside: YES  Patient instructed on use: YES    Patient Safety:    Falls Score: 3  Bed Alarm On? No  Sitter?  No    Tolu Rivera RN

## 2018-01-22 NOTE — PROGRESS NOTES
Occupational Therapy  1435:  Chart reviewed, discussed patient with nursing. Attempted to see patient for OT treatment session, but she declined, indicating that the was \"out of it\" because of morphine. When asked, she said that dose was about 1:00. Attempted to persuade her to participate with no luck. Discussed the importance of activity and again encouraged out of bed activity. Patient continued to decline. Will defer for now and follow up tomorrow.

## 2018-01-23 VITALS
HEIGHT: 67 IN | OXYGEN SATURATION: 96 % | RESPIRATION RATE: 18 BRPM | SYSTOLIC BLOOD PRESSURE: 126 MMHG | DIASTOLIC BLOOD PRESSURE: 79 MMHG | WEIGHT: 293 LBS | BODY MASS INDEX: 45.99 KG/M2 | TEMPERATURE: 98.3 F | HEART RATE: 73 BPM

## 2018-01-23 DIAGNOSIS — R17 JAUNDICE: Primary | ICD-10-CM

## 2018-01-23 PROCEDURE — 74011250637 HC RX REV CODE- 250/637: Performed by: SURGERY

## 2018-01-23 PROCEDURE — 77030018719 HC DRSG PTCH ANTIMIC J&J -A

## 2018-01-23 PROCEDURE — 74011250636 HC RX REV CODE- 250/636: Performed by: SURGERY

## 2018-01-23 PROCEDURE — 74011000258 HC RX REV CODE- 258: Performed by: SURGERY

## 2018-01-23 PROCEDURE — 74011000250 HC RX REV CODE- 250: Performed by: SURGERY

## 2018-01-23 RX ORDER — TRAMADOL HYDROCHLORIDE 50 MG/1
50-100 TABLET ORAL
Qty: 50 TAB | Refills: 0 | Status: SHIPPED | OUTPATIENT
Start: 2018-01-23 | End: 2019-06-24

## 2018-01-23 RX ADMIN — LOSARTAN POTASSIUM 50 MG: 50 TABLET, FILM COATED ORAL at 10:21

## 2018-01-23 RX ADMIN — METRONIDAZOLE 500 MG: 500 INJECTION, SOLUTION INTRAVENOUS at 10:22

## 2018-01-23 RX ADMIN — TRAMADOL HYDROCHLORIDE 50 MG: 50 TABLET, FILM COATED ORAL at 11:29

## 2018-01-23 RX ADMIN — DEXTROSE MONOHYDRATE, SODIUM CHLORIDE, AND POTASSIUM CHLORIDE 25 ML/HR: 50; 4.5; 1.49 INJECTION, SOLUTION INTRAVENOUS at 06:39

## 2018-01-23 RX ADMIN — AZTREONAM 1 G: 1 INJECTION, POWDER, LYOPHILIZED, FOR SOLUTION INTRAMUSCULAR; INTRAVENOUS at 06:39

## 2018-01-23 RX ADMIN — TRAMADOL HYDROCHLORIDE 50 MG: 50 TABLET, FILM COATED ORAL at 04:04

## 2018-01-23 RX ADMIN — METRONIDAZOLE 500 MG: 500 INJECTION, SOLUTION INTRAVENOUS at 02:01

## 2018-01-23 RX ADMIN — FLUOXETINE 60 MG: 20 CAPSULE ORAL at 10:21

## 2018-01-23 RX ADMIN — HEPARIN SODIUM 5000 UNITS: 5000 INJECTION, SOLUTION INTRAVENOUS; SUBCUTANEOUS at 10:21

## 2018-01-23 NOTE — PROGRESS NOTES
Pt with d/c order and states ready for d/c. PIV d/c from right wrist, site benign. PT given d/c instructions both written and verbally and voices understanding. Pt given opportunity to have any questions answered. Pt and pts  shown how to empty the BRANDI drain and Ttube drain and how to record output for f/u visit. Pts  gave return demonstration successfully. Bio patches around drains changed per Dr Sahil Murray request. Pt left via w/c with volunteer escort,  to provide transportation.

## 2018-01-23 NOTE — PROGRESS NOTES
Spiritual Care Assessment/Progress Notes    Jasmin Maguire [id number]  [id number]    1980  42 y.o.  female    Patient Telephone Number: [phone] (home)   Spiritism Affiliation: No Advent   Language: English   Extended Emergency Contact Information  Primary Emergency Contact: Leannehang Loza  Address: [address], [address]  Mobile Phone: [phone]  Relation: Spouse   Patient Active Problem List    Diagnosis Date Noted    Common bile duct stone 01/18/2018    Jaundice 01/17/2018    Pregnancy 06/04/2011    Pregnancy 05/31/2011    Early/threatened labor 05/31/2011        Date: 1/22/2018       Level of Spiritism/Spiritual Activity:  [x]         Involved in burak tradition/spiritual practice    []         Not involved in burak tradition/spiritual practice  [x]         Spiritually oriented    []         Claims no spiritual orientation    []         seeking spiritual identity  []         Feels alienated from Episcopalian practice/tradition  []         Feels angry about Episcopalian practice/tradition  [x]         Spirituality/Episcopalian tradition is a resource for coping at this time.   []         Not able to assess due to medical condition    Services Provided Today:  [x]         crisis intervention    []         reading Scriptures  [x]         spiritual assessment    []         prayer  [x]         empathic listening/emotional support  []         rites and rituals (cite in comments)  []         life review     []         Episcopalian support  []         theological development   []         advocacy  []         ethical dialog     []         blessing  []         bereavement support    []         support to family  [x]         anticipatory grief support   []         help with AMD  []         spiritual guidance    []         meditation      Spiritual Care Needs  [x]         Emotional Support  [x]         Spiritual/Spiritism Care  [] Loss/Adjustment  []         Advocacy/Referral                /Ethics  []         No needs expressed at               this time  []         Other: (note in               comments)  Spiritual Care Plan  []         Follow up visits with               pt/family  []         Provide materials  []         Schedule sacraments  []         Contact Community               Clergy  [x]         Follow up as needed  []         Other: (note in               comments)     Comments:   Responded to page for support of pt whose mother is dying. Checked in with RN before entering room who shared that pt's family had just left and pt's mother is in process of dying at this time. Pt was lying back in bed with head raised; TV was on but pt did not seem to be paying attention to it. Provided active listening as pt shared about her mother, Lenard Khalil, who has been on hospice since September and who is believed to be in her final transition. Pt was tearful and thoughtful in her recollections of her mother. Pt shared that she was especially sad that her brother had to handle all of this on his own - that she couldn't be there. Pt acknowledged the difficult learning of not taking care of self, especially when a loved one is sick. Natalee Becerra also shared that she and her children were recently baptized for pt's mother. Natalee Becerra shared that she wanted to be more involved in the Rastafari.  encouraged her interest and cited it as a potentially wonderful support for her and her family at this challenging time. Provided prayer and grief support. Provided assurance of additional prayer especially for this evening. Natalee Becerra shared that it was her , Diana Jasso, who had asked for  support for her. Pt expressed gratitude for  visit and healing time to process some of what is on her heart.  informed Natalee Becerra of  availability. For Spiritual Care paging please call [phone]). Rea Henderson M.Div.   Palliative  Fellow

## 2018-01-23 NOTE — DISCHARGE INSTRUCTIONS
Patient Discharge Instructions    Josefina Lara / [id number] : 1980    Admitted 2018 Discharged: 2018         What to do at Home    Recommended diet: Low fat, Low cholesterol    Recommended activity: no heavy lifting > 20 lbs x 6 weeks; no driving while taking narcotics for pain. May take shower, but no bath x 2 weeks. Empty and keep record of T-tube and BRANDI output daily. If you experience a lot of drainage, develop redness around the wound, or a fever over 101 F occurs please call the office. Narcotics and anesthesia sometimes cause nausea and vomiting. If persistent please call the office. Do not hesitate to call with questions or concerns. Follow-up with Dr. Bernard Dunham in 1 week. Please call [phone] for an appointment. Information obtained by :  I understand that if any problems occur once I am at home I am to contact my physician. I understand and acknowledge receipt of the instructions indicated above.                                                                                                                                                Physician's or R.N.'s Signature                                                                  Date/Time                                                                                                                                              Patient or Representative Signature                                                          Date/Time

## 2018-01-23 NOTE — PROGRESS NOTES
SURGERY PROGRESS NOTE      Admit Date: 2018    POD 5 Days Post-Op    Procedure: Procedure(s):   LAPAROSCOPIC CHOLECYSTECTOY, intraoperative cholangiogram CONVERT TO OPEN AT 1530h with common bile duct exploration under fluoroscopy, removal of common bile duct stones, t tube insertion  ENDOSCOPIC RETROGRADE CHOLANGIOPANCREATOGRAPHY (ERCP), sphincterotomy, common bile duct stone extraction      Subjective:     +BMs. Tolerating diet. Pain controlled with Tramadol. T-tube output decreased. Objective:     Visit Vitals    /79    Pulse 73    Temp 98.3 °F (36.8 °C)    Resp 18    Ht 5' 7\" (1.702 m)    Wt 135.6 kg (299 lb)    LMP 2017    SpO2 96%    Breastfeeding No    BMI 46.83 kg/m2        Temp (24hrs), Av.1 °F (36.7 °C), Min:97.8 °F (36.6 °C), Max:98.3 °F (36.8 °C)      Physical Exam:     Abdomen:  Soft. Non-distended. JN dressing C/D/I. BRANDI with minimal serosanguinous drainage. T-tube with small amount of bile in the bag.       Assessment:     Active Problems:    Jaundice (2018)      Common bile duct stone (2018)        Plan/Recommendations/Medical Decision Making:     Flush T-tube  Discharge home with home health

## 2018-01-23 NOTE — ROUTINE PROCESS
General Surgery End of Shift Nursing Note    Bedside shift change report given to gwendolyn (oncoming nurse) by Estefany Miller (offgoing nurse). Report included the following information SBAR, Kardex, OR Summary, Procedure Summary, Intake/Output, MAR and Recent Results. Shift worked:   night   Summary of shift:    Pt pain well tolerated with tramadol. O/p from T tube <3ml. Issues for physician to address:   Pt concerned about decrease in T-tube output. Pt stated that they emptied a good amount before change of shift. < 3 ml output for night RN's shift.         GI:    Current diet:  DIET REGULAR 50GM Fat    Tolerating current diet: YES  Passing flatus: YES  Last Bowel Movement: several days ago   Appearance:     Respiratory:    Incentive Spirometer at bedside: YES  Patient instructed on use: YES    Patient Safety:    Falls Score: [place]

## 2018-01-25 NOTE — DISCHARGE SUMMARY
DISCHARGE SUMMARY      Patient ID:  Josefina Lara  [id number]  12 y.o.  1980    Admit date: 1/17/2018    Discharge date and time: 1/23/2018 12:33 PM     Admitting Physician: Lisa Clemente MD     Discharge Physician: Bernard Dunham MD    Admission Diagnoses: Gallstone/IV ABX;Jaundice;GALLSTONES;Common bile duct stone    Procedures: Procedure(s):   LAPAROSCOPIC CHOLECYSTECTOY, intraoperative cholangiogram CONVERT TO OPEN AT 1530h with common bile duct exploration under fluoroscopy, removal of common bile duct stones, t tube insertion  ENDOSCOPIC RETROGRADE CHOLANGIOPANCREATOGRAPHY (ERCP), sphincterotomy, common bile duct stone extraction    Discharge Diagnoses: Active Problems:    Jaundice (1/17/2018)      Common bile duct stone (1/18/2018)          Hospital Course:  Patient admitted with jaundice and common bile duct. Patient underwent ERCP followed by lap converted to open cholecystectomy, cholangiogram, CBD exploration with removal of common bile duct stones, and T-tube placement on 1/18/18. Post-op course was unremarkable. Patient tolerated advancement of diet with return of bowel function and was discharged home on POD # 5 with the drains and home health.       D/C Disposition: home     Diet: low fat diet    Follow-up Information     Follow up With Details Comments Contact Info    Jani Gao MD   [address]  [address] [address] [phone]              Signed:  Lisa Clemente MD  1/25/2018  2:37 PM

## 2018-01-29 ENCOUNTER — OFFICE VISIT (OUTPATIENT)
Dept: SURGERY | Age: 38
End: 2018-01-29

## 2018-01-29 VITALS
HEART RATE: 73 BPM | OXYGEN SATURATION: 97 % | HEIGHT: 67 IN | BODY MASS INDEX: 45.99 KG/M2 | SYSTOLIC BLOOD PRESSURE: 119 MMHG | TEMPERATURE: 98.2 F | DIASTOLIC BLOOD PRESSURE: 85 MMHG | RESPIRATION RATE: 16 BRPM | WEIGHT: 293 LBS

## 2018-01-29 DIAGNOSIS — Z09 POSTOPERATIVE EXAMINATION: Primary | ICD-10-CM

## 2018-01-29 RX ORDER — CLINDAMYCIN HYDROCHLORIDE 300 MG/1
300 CAPSULE ORAL 3 TIMES DAILY
Qty: 30 CAP | Refills: 0 | Status: SHIPPED | OUTPATIENT
Start: 2018-01-29 | End: 2018-02-08

## 2018-01-29 NOTE — MR AVS SNAPSHOT
[address], [address] [facility], [address] [place] [address] [facility] 
[phone] Patient: Irish Thomson MRN: [id number] PRIMITIVO:7/08/1865 Visit Information Date & Time Provider Department Dept. Phone Encounter #  
 1/29/2018  2:00 PM Priyank Moreau MD [facility] [place] [address] [id number] Your Appointments 2/2/2018  1:40 PM  
POST OP with Priyank Moraeu MD  
[facility] Dr. Jerardo Pierce [facility] ([address] [facility]) Appt Note: po- lap tesha 1/18/18  
 [address] [facility], [address] [place] [address], [address] [address]  
[address] [place] [place] [address], [address] [place] [address] [address] [address] Upcoming Health Maintenance Date Due DTaP/Tdap/Td series (1 - Tdap) 3/11/1999 PAP AKA CERVICAL CYTOLOGY 3/11/1999 Influenza Age 5 to Adult 8/1/2017 Allergies as of 1/29/2018  Review Complete On: 1/29/2018 By: Priyank Moreau MD  
  
 Severity Noted Reaction Type Reactions Biaxin [Clarithromycin]  05/31/2011    Nausea Only Cephalosporins  05/31/2011    Rash Dilaudid [Hydromorphone]  01/18/2018    Shortness of Breath Extremely lethargic, Flushed Levaquin [Levofloxacin]  11/05/2015    Rash Penicillin G  05/31/2011   Systemic Rash Current Immunizations  Never Reviewed No immunizations on file. Not reviewed this visit You Were Diagnosed With   
  
 Codes Comments Postoperative examination    -  Primary ICD-10-CM: I33 ICD-9-CM: V67.00 Vitals BP Pulse Temp Resp Height(growth percentile) Weight(growth percentile) 119/85 (BP 1 Location: Left arm, BP Patient Position: Sitting) 73 98.2 °F (36.8 °C) (Oral) 16 5' 7\" (1.702 m) 295 lb 9.6 oz (134.1 kg) LMP SpO2 BMI OB Status Smoking Status 12/30/2017 97% 46.3 kg/m2 Having regular periods Former Smoker Vitals History BMI and BSA Data Body Mass Index Body Surface Area 46.3 kg/m 2 2.52 m 2 Preferred Pharmacy Pharmacy Name Phone [facility] [phone] , [address] [facility] AT [place] [address] [phone] Your Updated Medication List  
  
   
This list is accurate as of: 1/29/18  4:09 PM.  Always use your most recent med list.  
  
  
  
  
 CLARITIN 10 mg tablet Generic drug:  loratadine Take 5 mg by mouth as needed. clindamycin 300 mg capsule Commonly known as:  CLEOCIN Take 1 Cap by mouth three (3) times daily for 10 days. famotidine 20 mg tablet Commonly known as:  PEPCID Take 20 mg by mouth daily. FLUoxetine 40 mg capsule Commonly known as:  PROzac Take 60 mg by mouth nightly. ibuprofen 800 mg tablet Commonly known as:  MOTRIN Take 1 Tab by mouth every eight (8) hours. LORazepam 0.5 mg tablet Commonly known as:  ATIVAN Take 0.5 mg by mouth as needed. losartan 25 mg tablet Commonly known as:  COZAAR Take 50 mg by mouth nightly. promethazine 12.5 mg tablet Commonly known as:  PHENERGAN Take 1 Tab by mouth every six (6) hours as needed for Nausea. traMADol 50 mg tablet Commonly known as:  ULTRAM  
Take 1-2 Tabs by mouth every six (6) hours as needed for Pain. Max Daily Amount: 400 mg. Prescriptions Sent to Pharmacy Refills  
 clindamycin (CLEOCIN) 300 mg capsule 0 Sig: Take 1 Cap by mouth three (3) times daily for 10 days. Class: Normal  
 Pharmacy: [facility] [address] [place] [address] #: [phone] Route: Oral  
  
Introducing [facility]! Catalina Hernandez introduces Path patient portal. Now you can access parts of your medical record, email your doctor's office, and request medication refills online. 1. In your internet browser, go to [email or web address] [email or web address]. [employer] | [email or web address] 2. Click on the First Time User? Click Here link in the Sign In box. You will see the New Member Sign Up page. 3. Enter your [employer] Access Code exactly as it appears below. You will not need to use this code after youve completed the sign-up process. If you do not sign up before the expiration date, you must request a new code. · [employer] Access Code: [email or web address] Expires: 4/15/2018  2:25 PM 
 
4. Enter the last four digits of your Social Security Number (xxxx) and Date of Birth (mm/dd/yyyy) as indicated and click Submit. You will be taken to the next sign-up page. 5. Create a [employer] ID. This will be your [employer] login ID and cannot be changed, so think of one that is secure and easy to remember. 6. Create a [employer] password. You can change your password at any time. 7. Enter your Password Reset Question and Answer. This can be used at a later time if you forget your password. 8. Enter your e-mail address. You will receive e-mail notification when new information is available in [address] 9. Click Sign Up. You can now view and download portions of your medical record. 10. Click the Download Summary menu link to download a portable copy of your medical information. If you have questions, please visit the Frequently Asked Questions section of the [employer] website. Remember, [employer] is NOT to be used for urgent needs. For medical emergencies, dial 911. Now available from your iPhone and Android! Please provide this summary of care documentation to your next provider. Your primary care clinician is listed as Kevyn Estes. If you have any questions after today's visit, please call [phone].

## 2018-01-29 NOTE — PROGRESS NOTES
Christine Tobar is a 42 y.o. female        Chief Complaint   Patient presents with    Wound Check     Po-Lap Poppy 1/18/18       1. Have you been to the ER, urgent care clinic since your last visit? Hospitalized since your last visit? No    2. Have you seen or consulted any other health care providers outside of the [address] [place] [address] since your last visit? Include any pap smears or colon screening.  No

## 2018-01-29 NOTE — PROGRESS NOTES
Patient is here for follow-up. Patient underwent Laparoscopic converted to open cholecystectomy with intraoperative cholangiogram, CBD exploration with removal of common bile duct stones, and T-tube (14 Fr) placement on 1/18/18. Doing well other than pain around the t-tube drain. T-tube hasn't drained much past 2-3 days. PE:  Visit Vitals    /85 (BP 1 Location: Left arm, BP Patient Position: Sitting)    Pulse 73    Temp 98.2 °F (36.8 °C) (Oral)    Resp 16    Ht 5' 7\" (1.702 m)    Wt 134.1 kg (295 lb 9.6 oz)    LMP 12/30/2017    SpO2 97%    BMI 46.3 kg/m2     Abdomen:  Soft, ND. Inc C/D/I. Mild erythema just below lateral aspect of the incision. No fluctuance. No drainage. Drains in placed with mild rim of erythema around the drain site. Suture around the T-tube pulling on the skin. Suture removed and replaced. T-tube flushed easily. T-tube capped.     Assessment:    S/p Laparoscopic converted to open cholecystectomy with intraoperative cholangiogram, CBD exploration with removal of common bile duct stones, and T-tube (14 Fr) placement  Mild cellulitis    Plan:  -T-tube capped  -F/u LFTs  -RTC Fri as scheduled  -Clindamycin

## 2018-01-30 LAB
ALBUMIN SERPL-MCNC: 3.1 G/DL (ref 3.5–5.5)
ALP SERPL-CCNC: 582 IU/L (ref 39–117)
ALT SERPL-CCNC: 113 IU/L (ref 0–32)
AST SERPL-CCNC: 110 IU/L (ref 0–40)
BILIRUB DIRECT SERPL-MCNC: 1.06 MG/DL (ref 0–0.4)
BILIRUB SERPL-MCNC: 1.5 MG/DL (ref 0–1.2)
PROT SERPL-MCNC: 7 G/DL (ref 6–8.5)

## 2018-02-01 DIAGNOSIS — R79.89 ELEVATED LFTS: Primary | ICD-10-CM

## 2018-02-05 ENCOUNTER — OFFICE VISIT (OUTPATIENT)
Dept: SURGERY | Age: 38
End: 2018-02-05

## 2018-02-05 VITALS
HEIGHT: 67 IN | WEIGHT: 293 LBS | BODY MASS INDEX: 45.99 KG/M2 | OXYGEN SATURATION: 100 % | RESPIRATION RATE: 18 BRPM | SYSTOLIC BLOOD PRESSURE: 135 MMHG | DIASTOLIC BLOOD PRESSURE: 91 MMHG | TEMPERATURE: 98.6 F | HEART RATE: 75 BPM

## 2018-02-05 DIAGNOSIS — Z09 POSTOPERATIVE EXAMINATION: Primary | ICD-10-CM

## 2018-02-05 NOTE — PROGRESS NOTES
Patient is here for follow-up. Patient underwent Laparoscopic converted to open cholecystectomy with intraoperative cholangiogram, CBD exploration with removal of common bile duct stones, and T-tube (14 Fr) placement on 1/18/18. Doing well other than pain around the t-tube drain. BRANDI put out 7 cc yesterday. PE:  Visit Vitals    Pulse 75    Temp 98.6 °F (37 °C) (Oral)    Resp 18    Ht 5' 7\" (1.702 m)    Wt 135 kg (297 lb 9.6 oz)    SpO2 100%    BMI 46.61 kg/m2     Abdomen:  Soft, ND. Inc C/D/I. Drains in place. BRANDI with serosanguinous drainage. T-tube capped.     Assessment:    S/p Laparoscopic converted to open cholecystectomy with intraoperative cholangiogram, CBD exploration with removal of common bile duct stones, and T-tube (14 Fr) placement    Plan:  -Staples removed  -D/c BRANDI  -RTC in 2 weeks  -f/u on labs

## 2018-02-05 NOTE — PROGRESS NOTES
Minnie Garnett is a 42 y.o. female  Chief Complaint   Patient presents with    Surgical Follow-up     Visit Vitals    Resp 18    Ht 5' 7\" (1.702 m)    Wt 135 kg (297 lb 9.6 oz)    BMI 46.61 kg/m2     1. Have you been to the ER, urgent care clinic since your last visit? Hospitalized since your last visit? No    2. Have you seen or consulted any other health care providers outside of the [address] [place] [address] since your last visit? Include any pap smears or colon screening.  No

## 2018-02-06 LAB
ALBUMIN SERPL-MCNC: 3.7 G/DL (ref 3.5–5.5)
ALP SERPL-CCNC: 398 IU/L (ref 39–117)
ALT SERPL-CCNC: 79 IU/L (ref 0–32)
AST SERPL-CCNC: 74 IU/L (ref 0–40)
BILIRUB DIRECT SERPL-MCNC: 0.61 MG/DL (ref 0–0.4)
BILIRUB SERPL-MCNC: 1 MG/DL (ref 0–1.2)
PROT SERPL-MCNC: 7.2 G/DL (ref 6–8.5)

## 2018-02-16 ENCOUNTER — OFFICE VISIT (OUTPATIENT)
Dept: SURGERY | Age: 38
End: 2018-02-16

## 2018-02-16 VITALS
SYSTOLIC BLOOD PRESSURE: 133 MMHG | RESPIRATION RATE: 20 BRPM | HEART RATE: 66 BPM | TEMPERATURE: 98.4 F | HEIGHT: 67 IN | DIASTOLIC BLOOD PRESSURE: 90 MMHG | OXYGEN SATURATION: 99 % | BODY MASS INDEX: 45.99 KG/M2 | WEIGHT: 293 LBS

## 2018-02-16 DIAGNOSIS — Z09 POSTOPERATIVE EXAMINATION: ICD-10-CM

## 2018-02-16 DIAGNOSIS — Z98.890: Primary | ICD-10-CM

## 2018-02-16 RX ORDER — LOSARTAN POTASSIUM 50 MG/1
TABLET ORAL
Refills: 5 | COMMUNITY
Start: 2018-01-26

## 2018-02-16 RX ORDER — FLUOXETINE HYDROCHLORIDE 20 MG/1
CAPSULE ORAL
Refills: 4 | COMMUNITY
Start: 2018-02-02

## 2018-02-16 NOTE — MR AVS SNAPSHOT
[address] [place] [address] [address], [address] [place] [address], [facility] [address] [facility] 
[phone] Patient: Genet Pedraza MRN: [id number] KHY:[birth year] Visit Information Date & Time Provider Department Dept. Phone Encounter #  
 2/16/2018  1:40 PM Josefina Dias MD [facility] [place] [id number] Your Appointments 2/23/2018  2:40 PM  
POST OP with Josefina Dias MD  
[facility] Dr. Jerardo Pierce [facility] ([address] [place] [address]) Appt Note: po-lap tesha (f/u drain removal) [address] [place] [address], [address] [address]  
[address], [address], [address] [place] [address] [address] [address] Upcoming Health Maintenance Date Due DTaP/Tdap/Td series (1 - Tdap) 3/11/1999 PAP AKA CERVICAL CYTOLOGY 3/11/1999 Influenza Age 5 to Adult 8/1/2017 Allergies as of 2/16/2018  Review Complete On: 2/16/2018 By: Sarah Richter LPN Severity Noted Reaction Type Reactions Biaxin [Clarithromycin]  05/31/2011    Nausea Only Cephalosporins  05/31/2011    Rash Dilaudid [Hydromorphone]  01/18/2018    Shortness of Breath Extremely lethargic, Flushed Levaquin [Levofloxacin]  11/05/2015    Rash Penicillin G  05/31/2011   Systemic Rash Current Immunizations  Reviewed on 2/5/2018 No immunizations on file. Not reviewed this visit You Were Diagnosed With   
  
 Codes Comments History of insertion of T-tube into biliary tract    -  Primary ICD-10-CM: L74.675 ICD-9-CM: V15.29 Vitals BP Pulse Temp Resp Height(growth percentile) Weight(growth percentile) 133/90 (BP 1 Location: Left arm, BP Patient Position: Sitting) 66 98.4 °F (36.9 °C) (Oral) 20 5' 7\" (1.702 m) 299 lb 9.6 oz (135.9 kg) LMP SpO2 BMI OB Status Smoking Status 01/25/2018 99% 46.92 kg/m2 Having regular periods Former Smoker Vitals History BMI and BSA Data Body Mass Index Body Surface Area  
 46.92 kg/m 2 2.53 m 2 Preferred Pharmacy Pharmacy Name Phone [facility] [phone], [address] AT [place] [address] [phone] Your Updated Medication List  
  
   
This list is accurate as of: 2/16/18  1:59 PM.  Always use your most recent med list.  
  
  
  
  
 CLARITIN 10 mg tablet Generic drug:  loratadine Take 5 mg by mouth as needed. famotidine 20 mg tablet Commonly known as:  PEPCID Take 20 mg by mouth daily. * FLUoxetine 40 mg capsule Commonly known as:  PROzac Take 60 mg by mouth nightly. * FLUoxetine 20 mg capsule Commonly known as:  PROzac TK 3 CS PO QD  
  
 ibuprofen 800 mg tablet Commonly known as:  MOTRIN Take 1 Tab by mouth every eight (8) hours. LORazepam 0.5 mg tablet Commonly known as:  ATIVAN Take 0.5 mg by mouth as needed. losartan 50 mg tablet Commonly known as:  COZAAR TK 1 T PO QD  
  
 promethazine 12.5 mg tablet Commonly known as:  PHENERGAN Take 1 Tab by mouth every six (6) hours as needed for Nausea. traMADol 50 mg tablet Commonly known as:  ULTRAM  
Take 1-2 Tabs by mouth every six (6) hours as needed for Pain. Max Daily Amount: 400 mg.  
  
 * Notice: This list has 2 medication(s) that are the same as other medications prescribed for you. Read the directions carefully, and ask your doctor or other care provider to review them with you. To-Do List   
 02/16/2018 Imaging:  XR CHOLANGIOGRAM T TUBE   
  
 02/20/2018 9:00 AM  
  Appointment with Arsenio Keys MD; [address] [place] [address] at [facility] ([phone]) Adult-Nothing to eat or drink after midnight. Patient needs to register 30 minutes prior to exam.  
  
  
Introducing [address]! Dear Madelaine Land: Thank you for requesting a [employer] account.   Our records indicate that you have previously registered for a [employer] account but its currently inactive. Please call our [employer] support line at [phone]. Additional Information If you have questions, please visit the Frequently Asked Questions section of the [employer] website at [email or web address] Remember, [employer] is NOT to be used for urgent needs. For medical emergencies, dial 911. Now available from your iPhone and Android! Please provide this summary of care documentation to your next provider. Your primary care clinician is listed as Rosalba Haynes. If you have any questions after today's visit, please call [phone].

## 2018-02-16 NOTE — PROGRESS NOTES
Patient is here for follow-up. Patient underwent Laparoscopic converted to open cholecystectomy with intraoperative cholangiogram, CBD exploration with removal of common bile duct stones, and T-tube (14 Fr) placement on 1/18/18. Doing well other than pain around the t-tube drain. PE:  Visit Vitals    /90 (BP 1 Location: Left arm, BP Patient Position: Sitting)    Pulse 66    Temp 98.4 °F (36.9 °C) (Oral)    Resp 20    Ht 5' 7\" (1.702 m)    Wt 135.9 kg (299 lb 9.6 oz)    LMP 01/25/2018    SpO2 99%    BMI 46.92 kg/m2     Abdomen:  Soft, ND. Inc C/D/I. Small drainage around the T-tube site.     Assessment:    S/p Laparoscopic converted to open cholecystectomy with intraoperative cholangiogram, CBD exploration with removal of common bile duct stones, and T-tube (14 Fr) placement    Plan:  -clean around the T-tube with H2O2  -T-tube cholangiogram  -F/u after cholangiogram and will plan to d/c T-tube next visit

## 2018-02-16 NOTE — PROGRESS NOTES
Chief Complaint   Patient presents with    Post OP Follow Up     Lab Poppy 1/18/18     1. Have you been to the ER, urgent care clinic since your last visit? Hospitalized since your last visit? No    2. Have you seen or consulted any other health care providers outside of the [address] [place] [address] since your last visit? Include any pap smears or colon screening.  No

## 2018-02-20 ENCOUNTER — HOSPITAL ENCOUNTER (OUTPATIENT)
Dept: GENERAL RADIOLOGY | Age: 38
Discharge: HOME | End: 2018-02-20
Attending: SURGERY
Payer: COMMERCIAL

## 2018-02-20 DIAGNOSIS — Z98.890: ICD-10-CM

## 2018-02-20 PROCEDURE — 74011636320 HC RX REV CODE- 636/320: Performed by: RADIOLOGY

## 2018-02-20 PROCEDURE — 47531 INJECTION FOR CHOLANGIOGRAM: CPT

## 2018-02-20 RX ADMIN — IOHEXOL 15 ML: 240 INJECTION, SOLUTION INTRATHECAL; INTRAVASCULAR; INTRAVENOUS; ORAL at 11:00

## 2018-02-23 ENCOUNTER — OFFICE VISIT (OUTPATIENT)
Dept: SURGERY | Age: 38
End: 2018-02-23

## 2018-02-23 VITALS
DIASTOLIC BLOOD PRESSURE: 91 MMHG | SYSTOLIC BLOOD PRESSURE: 134 MMHG | OXYGEN SATURATION: 98 % | RESPIRATION RATE: 20 BRPM | WEIGHT: 293 LBS | TEMPERATURE: 98.8 F | HEART RATE: 94 BPM | HEIGHT: 67 IN | BODY MASS INDEX: 45.99 KG/M2

## 2018-02-23 DIAGNOSIS — R79.89 ELEVATED LFTS: Primary | ICD-10-CM

## 2018-02-23 NOTE — MR AVS SNAPSHOT
[address] [place] [address] [address] [place] [address] 
[phone] Patient: Romayne Reas MRN: [id number] [facility]:[id number] Visit Information Date & Time Provider Department Dept. Phone Encounter #  
 2/23/2018  2:40 PM Blaise Felty, MD [facility] [place] [address] [id number] Upcoming Health Maintenance Date Due DTaP/Tdap/Td series (1 - Tdap) 3/11/1999 PAP AKA CERVICAL CYTOLOGY 3/11/1999 Influenza Age 5 to Adult 8/1/2017 Allergies as of 2/23/2018  Review Complete On: 2/23/2018 By: Blaise Felty, MD  
  
 Severity Noted Reaction Type Reactions Biaxin [Clarithromycin]  05/31/2011    Nausea Only Cephalosporins  05/31/2011    Rash Dilaudid [Hydromorphone]  01/18/2018    Shortness of Breath Extremely lethargic, Flushed Levaquin [Levofloxacin]  11/05/2015    Rash Penicillin G  05/31/2011   Systemic Rash Current Immunizations  Reviewed on 2/5/2018 No immunizations on file. Not reviewed this visit You Were Diagnosed With   
  
 Codes Comments Elevated LFTs    -  Primary ICD-10-CM: R79.89 ICD-9-CM: 790.6 Vitals BP Pulse Temp Resp Height(growth percentile) Weight(growth percentile) (!) 134/91 (BP 1 Location: Right arm, BP Patient Position: Sitting) 94 98.8 °F (37.1 °C) (Oral) 20 5' 7\" (1.702 m) 301 lb 3.2 oz (136.6 kg) LMP SpO2 BMI OB Status Smoking Status 01/25/2018 98% 47.17 kg/m2 Having regular periods Former Smoker Vitals History BMI and BSA Data Body Mass Index Body Surface Area  
 47.17 kg/m 2 2.54 m 2 Preferred Pharmacy [facility] [phone] [facility], [address] AT [place] [address] [phone] Your Updated Medication List  
  
   
This list is accurate as of 2/23/18  3:52 PM.  Always use your most recent med list.  
  
  
  
  
 CLARITIN 10 mg tablet Generic drug:  loratadine Take 5 mg by mouth as needed. famotidine 20 mg tablet Commonly known as:  PEPCID Take 20 mg by mouth daily. * FLUoxetine 40 mg capsule Commonly known as:  PROzac Take 60 mg by mouth nightly. * FLUoxetine 20 mg capsule Commonly known as:  PROzac TK 3 CS PO QD  
  
 ibuprofen 800 mg tablet Commonly known as:  MOTRIN Take 1 Tab by mouth every eight (8) hours. LORazepam 0.5 mg tablet Commonly known as:  ATIVAN Take 0.5 mg by mouth as needed. losartan 50 mg tablet Commonly known as:  COZAAR TK 1 T PO QD  
  
 promethazine 12.5 mg tablet Commonly known as:  PHENERGAN Take 1 Tab by mouth every six (6) hours as needed for Nausea. traMADol 50 mg tablet Commonly known as:  ULTRAM  
Take 1-2 Tabs by mouth every six (6) hours as needed for Pain. Max Daily Amount: 400 mg.  
  
 * Notice: This list has 2 medication(s) that are the same as other medications prescribed for you. Read the directions carefully, and ask your doctor or other care provider to review them with you. We Performed the Following HEPATIC FUNCTION PANEL [83747 CPT(R)] Introducing [facility] SERVICES! Dear Vandana Offer: Thank you for requesting a [employer] account. Our records indicate that you have previously registered for a [employer] account but its currently inactive. Please call our [employer] support line at [phone]. Additional Information If you have questions, please visit the Frequently Asked Questions section of the [employer] website at [email or web address] Remember, [employer] is NOT to be used for urgent needs. For medical emergencies, dial 911. Now available from your iPhone and Android! Please provide this summary of care documentation to your next provider. Your primary care clinician is listed as Severo Apley.  If you have any questions after today's visit, please call [phone].

## 2018-02-23 NOTE — PROGRESS NOTES
Chief Complaint   Patient presents with    Post OP Follow Up     Lab Poppy     1. Have you been to the ER, urgent care clinic since your last visit? Hospitalized since your last visit? No    2. Have you seen or consulted any other health care providers outside of the [address] [place] [address] since your last visit? Include any pap smears or colon screening.  No    Visit Vitals    BP (!) 134/91 (BP 1 Location: Right arm, BP Patient Position: Sitting)    Pulse 94    Temp 98.8 °F (37.1 °C) (Oral)    Resp 20    Ht 5' 7\" (1.702 m)    Wt 136.6 kg (301 lb 3.2 oz)    SpO2 98%    BMI 47.17 kg/m2

## 2018-02-23 NOTE — PROGRESS NOTES
Patient is here for follow-up. Patient underwent Laparoscopic converted to open cholecystectomy with intraoperative cholangiogram, CBD exploration with removal of common bile duct stones, and T-tube (14 Fr) placement on 1/18/18. Patient underwent T-tube cholangiogram on 2/20/18 which showed t-tube outside of the ductal system. Patient feeling well other than pain around the tube. No F/C/S. PE:  Visit Vitals    BP (!) 134/91 (BP 1 Location: Right arm, BP Patient Position: Sitting)    Pulse 94    Temp 98.8 °F (37.1 °C) (Oral)    Resp 20    Ht 5' 7\" (1.702 m)    Wt 136.6 kg (301 lb 3.2 oz)    LMP 01/25/2018    SpO2 98%    BMI 47.17 kg/m2     Abdomen:  Soft, ND. Inc C/D/I. Small drainage around the T-tube site. T-tube removed and site cleaned with H2O2. Assessment:    S/p Laparoscopic converted to open cholecystectomy with intraoperative cholangiogram, CBD exploration with removal of common bile duct stones, and T-tube (14 Fr) placement    Plan:  -T-tube removed.   -Check LFTs

## 2018-02-24 LAB
ALBUMIN SERPL-MCNC: 3.8 G/DL (ref 3.5–5.5)
ALP SERPL-CCNC: 161 IU/L (ref 39–117)
ALT SERPL-CCNC: 35 IU/L (ref 0–32)
AST SERPL-CCNC: 30 IU/L (ref 0–40)
BILIRUB DIRECT SERPL-MCNC: 0.19 MG/DL (ref 0–0.4)
BILIRUB SERPL-MCNC: 0.4 MG/DL (ref 0–1.2)
PROT SERPL-MCNC: 6.7 G/DL (ref 6–8.5)

## 2018-03-13 ENCOUNTER — TELEPHONE (OUTPATIENT)
Dept: SURGERY | Age: 38
End: 2018-03-13

## 2018-03-13 NOTE — TELEPHONE ENCOUNTER
Patient returning Dr. Jolley Chain call. Patient is available to take calls on [phone] until 2:40pm.  If after 4:00pm please call her at home.

## 2018-03-14 NOTE — TELEPHONE ENCOUNTER
Dr Alarcon Re returned patient call after 4 PM . Called made to patient home phone per patient requested.

## 2018-12-31 ENCOUNTER — HOSPITAL ENCOUNTER (OUTPATIENT)
Dept: ULTRASOUND IMAGING | Age: 38
Discharge: HOME | End: 2018-12-31
Attending: FAMILY MEDICINE
Payer: COMMERCIAL

## 2018-12-31 DIAGNOSIS — R19.00 ABDOMINAL MASS: ICD-10-CM

## 2018-12-31 PROCEDURE — 76705 ECHO EXAM OF ABDOMEN: CPT

## 2019-01-11 ENCOUNTER — OFFICE VISIT (OUTPATIENT)
Dept: SURGERY | Age: 39
End: 2019-01-11

## 2019-01-11 VITALS
BODY MASS INDEX: 45.99 KG/M2 | SYSTOLIC BLOOD PRESSURE: 107 MMHG | HEART RATE: 83 BPM | HEIGHT: 67 IN | DIASTOLIC BLOOD PRESSURE: 77 MMHG | WEIGHT: 293 LBS | TEMPERATURE: 99.2 F | OXYGEN SATURATION: 97 %

## 2019-01-11 DIAGNOSIS — K43.9 ABDOMINAL WALL HERNIA: Primary | ICD-10-CM

## 2019-01-11 PROBLEM — E66.01 OBESITY, MORBID (HCC): Status: ACTIVE | Noted: 2019-01-11

## 2019-01-11 NOTE — PROGRESS NOTES
Surgery H&P Subjective:  
Patient 34 y.o.  female s/p laparoscopic converted to open cholecystectomy with intraoperative cholangiogram, CBD exploration with removal of common bile duct stones, and T-tube (14 Fr) placement on 1/18/18 presents with RUQ bulge since the summer of 2018. It's increasing in size but denies any pain. No obstructive symptoms. No nausea or vomiting. No F/C/S. No change in bowel habits. No diarrhea or constipation. Patient underwent soft tissue US on 12/31/18 and it showed large anterior abdominal wall hernia. Patient presents today for surgical consultation. Patient is a  and wants to see if she can wait to have surgery in the summer when the school is out. Past Medical & Surgical History: 
Past Medical History:  
Diagnosis Date  Asthma  Hypertension  PVC (premature ventricular contraction) Past Surgical History:  
Procedure Laterality Date  HX OTHER SURGICAL Vaginal delivery  HX OTHER SURGICAL  2018 GALLBLADDER SURGERY  
 HX TONSIL AND ADENOIDECTOMY Social History: 
Social History Socioeconomic History  Marital status:  Spouse name: Not on file  Number of children: Not on file  Years of education: Not on file  Highest education level: Not on file Social Needs  Financial resource strain: Not on file  Food insecurity - worry: Not on file  Food insecurity - inability: Not on file  Transportation needs - medical: Not on file  Transportation needs - non-medical: Not on file Occupational History  Not on file Tobacco Use  Smoking status: Former Smoker  Smokeless tobacco: Never Used Substance and Sexual Activity  Alcohol use: No  
 Drug use: No  
 Sexual activity: Yes  
  Partners: Male Birth control/protection: Pill Other Topics Concern  Not on file Social History Narrative  Not on file Family History: 
Family History Problem Relation Age of Onset  Hypertension Mother  Hypertension Father  Cancer Father  Cancer Maternal Grandfather  Heart Disease Paternal Grandmother Medications: 
Current Outpatient Medications Medication Sig  
 losartan (COZAAR) 50 mg tablet TK 1 T PO QD  
 FLUoxetine (PROZAC) 40 mg capsule Take 60 mg by mouth nightly.  famotidine (PEPCID) 20 mg tablet Take 20 mg by mouth as needed.  ibuprofen (MOTRIN) 800 mg tablet Take 1 Tab by mouth every eight (8) hours. (Patient taking differently: Take 400 mg by mouth as needed.)  loratadine (CLARITIN) 10 mg tablet Take 5 mg by mouth as needed.  FLUoxetine (PROZAC) 20 mg capsule TK 3 CS PO QD  
 traMADol (ULTRAM) 50 mg tablet Take 1-2 Tabs by mouth every six (6) hours as needed for Pain. Max Daily Amount: 400 mg.  LORazepam (ATIVAN) 0.5 mg tablet Take 0.5 mg by mouth as needed.  promethazine (PHENERGAN) 12.5 mg tablet Take 1 Tab by mouth every six (6) hours as needed for Nausea. No current facility-administered medications for this visit. Allergies: Allergies Allergen Reactions  Biaxin [Clarithromycin] Nausea Only  Cephalosporins Rash  Dilaudid [Hydromorphone] Shortness of Breath Extremely lethargic, Flushed  Levaquin [Levofloxacin] Rash  Penicillin G Rash Review of Systems A comprehensive review of systems was negative except for that written in the HPI. Objective:  
 
Exam: 
 
Visit Vitals /77 (BP 1 Location: Right arm, BP Patient Position: Sitting) Pulse 83 Temp 99.2 °F (37.3 °C) Ht 5' 7\" (1.702 m) Wt 148.2 kg (326 lb 12.8 oz) SpO2 97% BMI 51.18 kg/m² General appearance: alert, cooperative, no distress, appears stated age Eyes: negative Lungs: clear to auscultation bilaterally Heart: regular rate and rhythm Abdomen: soft, non-tender. Non-distended. Well healed RUQ incision with bulge. Difficult to appreciate the size of the fascial defect. Extremities: extremities normal, atraumatic, no cyanosis or edema. JORGE. Skin: Skin color, texture, turgor normal. No rashes or lesions. Neurologic: Grossly normal 
 
Assessment:  
 
RUQ incisional hernia Plan:  
 
CT scan to further assess the size of the hernia for surgical planning. Arjun Somers incisional hernia repair +/- component separation based on CT scan. Risks, benefit, and alternative to surgery was discussed with the patient. The risks of surgery include but not limited to infection, bleeding, intraabdominal organ injury, recurrence, possible conversion to open, and the risks of general anesthetic. Patient is agreeable to surgery but wants to hold off on surgery until the summer when school is out. All questions answered.

## 2019-01-11 NOTE — PROGRESS NOTES
Chief Complaint Patient presents with  Abdominal Pain HERNIA 1. Have you been to the ER, urgent care clinic since your last visit? Hospitalized since your last visit? YES, 1/17/18 
 
2. Have you seen or consulted any other health care providers outside of the [address] [place] [address] since your last visit? Include any pap smears or colon screening.  NO

## 2019-03-25 ENCOUNTER — TELEPHONE (OUTPATIENT)
Dept: SURGERY | Age: 39
End: 2019-03-25

## 2019-03-25 NOTE — TELEPHONE ENCOUNTER
Returned call to cell phone of patient. No answer. [place] [id number] cpt code. Call if anything else is needed. Work phone as department options that was unknown.

## 2019-03-25 NOTE — TELEPHONE ENCOUNTER
Patient states her insurance company needs the cpt code for her ct scan. If no answer, you can leave a message or try her at [phone]. Explain you are calling from a drs office.

## 2019-04-01 ENCOUNTER — HOSPITAL ENCOUNTER (OUTPATIENT)
Dept: CT IMAGING | Age: 39
Discharge: HOME | End: 2019-04-01
Attending: SURGERY
Payer: COMMERCIAL

## 2019-04-01 DIAGNOSIS — K43.9 ABDOMINAL WALL HERNIA: ICD-10-CM

## 2019-04-01 PROCEDURE — 74011636320 HC RX REV CODE- 636/320: Performed by: SURGERY

## 2019-04-01 PROCEDURE — 74011000255 HC RX REV CODE- 255: Performed by: SURGERY

## 2019-04-01 PROCEDURE — 74177 CT ABD & PELVIS W/CONTRAST: CPT

## 2019-04-01 RX ORDER — BARIUM SULFATE 20 MG/ML
900 SUSPENSION ORAL
Status: COMPLETED | OUTPATIENT
Start: 2019-04-01 | End: 2019-04-01

## 2019-04-01 RX ORDER — SODIUM CHLORIDE 0.9 % (FLUSH) 0.9 %
10 SYRINGE (ML) INJECTION
Status: COMPLETED | OUTPATIENT
Start: 2019-04-01 | End: 2019-04-01

## 2019-04-01 RX ADMIN — IOPAMIDOL 100 ML: 755 INJECTION, SOLUTION INTRAVENOUS at 10:54

## 2019-04-01 RX ADMIN — Medication 10 ML: at 10:54

## 2019-04-01 RX ADMIN — BARIUM SULFATE 900 ML: 21 SUSPENSION ORAL at 10:54

## 2019-04-08 ENCOUNTER — TELEPHONE (OUTPATIENT)
Dept: SURGERY | Age: 39
End: 2019-04-08

## 2019-04-08 NOTE — TELEPHONE ENCOUNTER
Pt had ct scan Mon4-1,would like results of test and when  surg can be jordan for hernia. Please return call till 3pm.job # [phone] school ,after 4pm call cell [phone].

## 2019-04-11 NOTE — TELEPHONE ENCOUNTER
Patient's  calling to see if surgery can be scheduled 6/18/19 or does surgery need to be scheduled sooner. Please return call today.

## 2019-04-12 NOTE — TELEPHONE ENCOUNTER
Returned call to . No answer. Dr Ludivina Garcia , spoke with patient. Incisional hernia surgery scheduled for the date the patient requested 6/27/19. Mailed surgery date and instructions to home address on file.

## 2019-06-04 ENCOUNTER — TELEPHONE (OUTPATIENT)
Dept: SURGERY | Age: 39
End: 2019-06-04

## 2019-06-05 NOTE — TELEPHONE ENCOUNTER
Returned call to  of patient.  will fax LA to [phone] . Informed that someone might be able to complete by 6/7/19, but could not promise that it would happen.

## 2019-06-10 ENCOUNTER — TELEPHONE (OUTPATIENT)
Dept: SURGERY | Age: 39
End: 2019-06-10

## 2019-06-10 NOTE — TELEPHONE ENCOUNTER
Patient's  calling again to check status on forms, would like to  tomorrow. Please return call today.

## 2019-06-24 ENCOUNTER — TELEPHONE (OUTPATIENT)
Dept: SURGERY | Age: 39
End: 2019-06-24

## 2019-06-24 RX ORDER — MONTELUKAST SODIUM 10 MG/1
10 TABLET ORAL
COMMUNITY

## 2019-06-24 RX ORDER — DOXYCYCLINE 100 MG/1
100 CAPSULE ORAL 2 TIMES DAILY
COMMUNITY

## 2019-06-24 NOTE — PERIOP NOTES
[facility]  Preoperative Instructions        Surgery Date 06/27/2019          Time of Arrival 0700 am Contact # [phone]    1. On the day of your surgery, please report to the Surgical Services Registration Desk and sign in at your designated time. The Surgery Center is located to the right of the Emergency Room. 2. You must have someone with you to drive you home. You should not drive a car for 24 hours following surgery. Please make arrangements for a friend or family member to stay with you for the first 24 hours after your surgery. 3. Do not have anything to eat or drink (including water, gum, mints, coffee, juice) after midnight ?? .? This may not apply to medications prescribed by your physician. ?(Please note below the special instructions with medications to take the morning of your procedure.)    4. We recommend you do not drink any alcoholic beverages for 24 hours before and after your surgery. 5. Contact your surgeons office for instructions on the following medications: non-steroidal anti-inflammatory drugs (i.e. Advil, Aleve), vitamins, and supplements. (Some surgeons will want you to stop these medications prior to surgery and others may allow you to take them)  **If you are currently taking Plavix, Coumadin, Aspirin and/or other blood-thinning agents, contact your surgeon for instructions. ** Your surgeon will partner with the physician prescribing these medications to determine if it is safe to stop or if you need to continue taking. Please do not stop taking these medications without instructions from your surgeon    6. Wear comfortable clothes. Wear glasses instead of contacts. Do not bring any money or jewelry. Please bring picture ID, insurance card, and any prearranged co-payment or hospital payment. Do not wear make-up, particularly mascara the morning of your surgery. Do not wear nail polish, particularly if you are having foot /hand surgery. Wear your hair loose or down, no ponytails, buns, remi pins or clips. All body piercings must be removed. Please shower with antibacterial soap for three consecutive days before and on the morning of surgery, but do not apply any lotions, powders or deodorants after the shower on the day of surgery. Please use a fresh towels after each shower. Please sleep in clean clothes and change bed linens the night before surgery. Please do not shave for 48 hours prior to surgery. Shaving of the face is acceptable. 7. You should understand that if you do not follow these instructions your surgery may be cancelled. If your physical condition changes (I.e. fever, cold or flu) please contact your surgeon as soon as possible. 8. It is important that you be on time. If a situation occurs where you may be late, please call [phone] (OR Holding Area). 9. If you have any questions and or problems, please call [phone] (Pre-admission Testing). 10. Your surgery time may be subject to change. You will receive a phone call the evening prior if your time changes. 11.  If having outpatient surgery, you must have someone to drive you here, stay with you during the duration of your stay, and to drive you home at time of discharge. 12.   In an effort to improve the efficiency, privacy, and safety for all of our Pre-op patients visitors are not allowed in the Holding area. Once you arrive and are registered your family/visitors will be asked to remain in the waiting room. The Pre-op staff will get you from the Surgical Waiting Area and will explain to you and your family/visitors that the Pre-op phase is beginning. The staff will answer any questions and provide instructions for tracking of the patient, by use of the existing tracking number and color-coded status board in the waiting room.   At this time the staff will also ask for your designated spokesperson information in the event that the physician or staff need to provide an update or obtain any pertinent information. The designated spokesperson will be notified if the physician needs to speak to family during the pre-operative phase. If at any time your family/visitors has questions or concerns they may approach the volunteer desk in the waiting area for assistance. Special Instructions:    MEDICATIONS TO TAKE THE MORNING OF SURGERY WITH A SIP OF WATER:pepcid if needed, claritin if needed, ativan if needed      I understand a pre-operative phone call will be made to verify my surgery time. In the event that I am not available, I give permission for a message to be left on my answering service and/or with another person?   yes         ___________________      __________   _________    (Signature of Patient)             (Witness)                (Date and Time)

## 2019-06-24 NOTE — PERIOP NOTES
Patient reports she is currently on antibiotics for a sinus infection and will complete them before the day of surgery. Instructed patient to call surgeon to make aware. She denies fever, just reports cough.

## 2019-06-24 NOTE — TELEPHONE ENCOUNTER
Patient is currently taking an antibiotic for a sinus infection but will be done on Wednesday evening prior to surgery Thursday morning.

## 2019-06-26 ENCOUNTER — ANESTHESIA EVENT (OUTPATIENT)
Dept: SURGERY | Age: 39
End: 2019-06-26
Payer: COMMERCIAL

## 2019-06-26 NOTE — PERIOP NOTES
Called Dr Don Husain office and spoke to Ifeanyi Rodgers to clarify surgical posting to match order for consent. Ifeanyi Rodgers to clarify.

## 2019-06-26 NOTE — ANESTHESIA PREPROCEDURE EVALUATION
Anesthetic History   No history of anesthetic complications            Review of Systems / Medical History  Patient summary reviewed, nursing notes reviewed and pertinent labs reviewed    Pulmonary            Asthma     Comments: Former smoker   Neuro/Psych         Psychiatric history    Comments: Depression Cardiovascular    Hypertension        Dysrhythmias : PVC      Exercise tolerance: >4 METS     GI/Hepatic/Renal               Comments: Incarcerated Incisional Hernia Endo/Other        Morbid obesity     Other Findings              Physical Exam    Airway  Mallampati: III  TM Distance: 4 - 6 cm  Neck ROM: normal range of motion   Mouth opening: Diminished (comment)     Cardiovascular  Regular rate and rhythm,  S1 and S2 normal,  no murmur, click, rub, or gallop             Dental  No notable dental hx       Pulmonary  Breath sounds clear to auscultation               Abdominal  GI exam deferred       Other Findings            Anesthetic Plan    ASA: 3  Anesthesia type: general          Induction: Intravenous  Anesthetic plan and risks discussed with: Patient

## 2019-06-27 ENCOUNTER — ANESTHESIA (OUTPATIENT)
Dept: SURGERY | Age: 39
End: 2019-06-27
Payer: COMMERCIAL

## 2019-06-27 ENCOUNTER — HOSPITAL ENCOUNTER (OUTPATIENT)
Age: 39
Setting detail: OUTPATIENT SURGERY
Discharge: HOME | End: 2019-06-27
Attending: SURGERY | Admitting: SURGERY
Payer: COMMERCIAL

## 2019-06-27 VITALS
DIASTOLIC BLOOD PRESSURE: 82 MMHG | BODY MASS INDEX: 45.99 KG/M2 | WEIGHT: 293 LBS | RESPIRATION RATE: 19 BRPM | TEMPERATURE: 98.1 F | OXYGEN SATURATION: 95 % | HEIGHT: 67 IN | SYSTOLIC BLOOD PRESSURE: 118 MMHG | HEART RATE: 91 BPM

## 2019-06-27 DIAGNOSIS — R52 PAIN: Primary | ICD-10-CM

## 2019-06-27 LAB — HCG UR QL: NEGATIVE

## 2019-06-27 PROCEDURE — 76210000001 HC OR PH I REC 2.5 TO 3 HR: Performed by: SURGERY

## 2019-06-27 PROCEDURE — 76210000020 HC REC RM PH II FIRST 0.5 HR: Performed by: SURGERY

## 2019-06-27 PROCEDURE — 77030002895 HC DEV VASC CLOSR COVD -B: Performed by: SURGERY

## 2019-06-27 PROCEDURE — 77030018846 HC SOL IRR STRL H20 ICUM -A: Performed by: SURGERY

## 2019-06-27 PROCEDURE — 77030020703 HC SEAL CANN DISP INTU -B: Performed by: SURGERY

## 2019-06-27 PROCEDURE — 77030027138 HC INCENT SPIROMETER -A

## 2019-06-27 PROCEDURE — 77030011640 HC PAD GRND REM COVD -A: Performed by: SURGERY

## 2019-06-27 PROCEDURE — 77030020782 HC GWN BAIR PAWS FLX 3M -B

## 2019-06-27 PROCEDURE — 74011250636 HC RX REV CODE- 250/636: Performed by: ANESTHESIOLOGY

## 2019-06-27 PROCEDURE — 74011250636 HC RX REV CODE- 250/636

## 2019-06-27 PROCEDURE — 77030003581 HC NDL INSUF VERES COVD -B: Performed by: SURGERY

## 2019-06-27 PROCEDURE — 77030008684 HC TU ET CUF COVD -B: Performed by: ANESTHESIOLOGY

## 2019-06-27 PROCEDURE — 81025 URINE PREGNANCY TEST: CPT

## 2019-06-27 PROCEDURE — 77030032490 HC SLV COMPR SCD KNE COVD -B: Performed by: SURGERY

## 2019-06-27 PROCEDURE — 77030032435

## 2019-06-27 PROCEDURE — 77030016151 HC PROTCTR LNS DFOG COVD -B: Performed by: SURGERY

## 2019-06-27 PROCEDURE — 74011250636 HC RX REV CODE- 250/636: Performed by: SURGERY

## 2019-06-27 PROCEDURE — 74011000250 HC RX REV CODE- 250

## 2019-06-27 PROCEDURE — C1781 MESH (IMPLANTABLE): HCPCS | Performed by: SURGERY

## 2019-06-27 PROCEDURE — 74011250637 HC RX REV CODE- 250/637

## 2019-06-27 PROCEDURE — 77030036554

## 2019-06-27 PROCEDURE — 74011000250 HC RX REV CODE- 250: Performed by: SURGERY

## 2019-06-27 PROCEDURE — 77030039266 HC ADH SKN EXOFIN S2SG -A: Performed by: SURGERY

## 2019-06-27 PROCEDURE — 77030019908 HC STETH ESOPH SIMS -A: Performed by: ANESTHESIOLOGY

## 2019-06-27 PROCEDURE — 77030026438 HC STYL ET INTUB CARD -A: Performed by: ANESTHESIOLOGY

## 2019-06-27 PROCEDURE — 77030002933 HC SUT MCRYL J&J -A: Performed by: SURGERY

## 2019-06-27 PROCEDURE — 77030008771 HC TU NG SALEM SUMP -A: Performed by: ANESTHESIOLOGY

## 2019-06-27 PROCEDURE — 77030010351 HC TRCR ENDOSC VSTP COVD -B: Performed by: SURGERY

## 2019-06-27 PROCEDURE — 76010000876 HC OR TIME 2 TO 2.5HR INTENSV - TIER 2: Performed by: SURGERY

## 2019-06-27 PROCEDURE — 76060000035 HC ANESTHESIA 2 TO 2.5 HR: Performed by: SURGERY

## 2019-06-27 PROCEDURE — 77030036554: Performed by: SURGERY

## 2019-06-27 PROCEDURE — 77030022704 HC SUT VLOC COVD -B: Performed by: SURGERY

## 2019-06-27 DEVICE — IMPLANTABLE DEVICE: Type: IMPLANTABLE DEVICE | Site: ABDOMEN | Status: FUNCTIONAL

## 2019-06-27 RX ORDER — PHENYLEPHRINE HCL IN 0.9% NACL 0.4MG/10ML
SYRINGE (ML) INTRAVENOUS AS NEEDED
Status: DISCONTINUED | OUTPATIENT
Start: 2019-06-27 | End: 2019-06-27 | Stop reason: HOSPADM

## 2019-06-27 RX ORDER — SODIUM CHLORIDE 0.9 % (FLUSH) 0.9 %
5-40 SYRINGE (ML) INJECTION EVERY 8 HOURS
Status: DISCONTINUED | OUTPATIENT
Start: 2019-06-27 | End: 2019-06-27 | Stop reason: HOSPADM

## 2019-06-27 RX ORDER — ONDANSETRON 2 MG/ML
INJECTION INTRAMUSCULAR; INTRAVENOUS AS NEEDED
Status: DISCONTINUED | OUTPATIENT
Start: 2019-06-27 | End: 2019-06-27 | Stop reason: HOSPADM

## 2019-06-27 RX ORDER — OXYCODONE HYDROCHLORIDE 5 MG/1
5 TABLET ORAL
Status: COMPLETED | OUTPATIENT
Start: 2019-06-27 | End: 2019-06-27

## 2019-06-27 RX ORDER — MIDAZOLAM HYDROCHLORIDE 1 MG/ML
INJECTION, SOLUTION INTRAMUSCULAR; INTRAVENOUS AS NEEDED
Status: DISCONTINUED | OUTPATIENT
Start: 2019-06-27 | End: 2019-06-27 | Stop reason: HOSPADM

## 2019-06-27 RX ORDER — FENTANYL CITRATE 50 UG/ML
INJECTION, SOLUTION INTRAMUSCULAR; INTRAVENOUS AS NEEDED
Status: DISCONTINUED | OUTPATIENT
Start: 2019-06-27 | End: 2019-06-27 | Stop reason: HOSPADM

## 2019-06-27 RX ORDER — SODIUM CHLORIDE 0.9 % (FLUSH) 0.9 %
5-40 SYRINGE (ML) INJECTION AS NEEDED
Status: DISCONTINUED | OUTPATIENT
Start: 2019-06-27 | End: 2019-06-27 | Stop reason: HOSPADM

## 2019-06-27 RX ORDER — PROPOFOL 10 MG/ML
INJECTION, EMULSION INTRAVENOUS AS NEEDED
Status: DISCONTINUED | OUTPATIENT
Start: 2019-06-27 | End: 2019-06-27 | Stop reason: HOSPADM

## 2019-06-27 RX ORDER — SODIUM CHLORIDE 0.9 % (FLUSH) 0.9 %
SYRINGE (ML) INJECTION
Status: DISCONTINUED
Start: 2019-06-27 | End: 2019-06-27 | Stop reason: HOSPADM

## 2019-06-27 RX ORDER — LIDOCAINE HYDROCHLORIDE 20 MG/ML
INJECTION, SOLUTION EPIDURAL; INFILTRATION; INTRACAUDAL; PERINEURAL AS NEEDED
Status: DISCONTINUED | OUTPATIENT
Start: 2019-06-27 | End: 2019-06-27 | Stop reason: HOSPADM

## 2019-06-27 RX ORDER — CLINDAMYCIN PHOSPHATE 900 MG/50ML
900 INJECTION INTRAVENOUS
Status: COMPLETED | OUTPATIENT
Start: 2019-06-27 | End: 2019-06-27

## 2019-06-27 RX ORDER — ONDANSETRON 2 MG/ML
4 INJECTION INTRAMUSCULAR; INTRAVENOUS ONCE
Status: CANCELLED | OUTPATIENT
Start: 2019-06-27 | End: 2019-06-27

## 2019-06-27 RX ORDER — TRAMADOL HYDROCHLORIDE 50 MG/1
100 TABLET ORAL
Qty: 40 TAB | Refills: 0 | Status: SHIPPED | OUTPATIENT
Start: 2019-06-27 | End: 2019-06-30

## 2019-06-27 RX ORDER — SODIUM CHLORIDE, SODIUM LACTATE, POTASSIUM CHLORIDE, CALCIUM CHLORIDE 600; 310; 30; 20 MG/100ML; MG/100ML; MG/100ML; MG/100ML
25 INJECTION, SOLUTION INTRAVENOUS CONTINUOUS
Status: DISCONTINUED | OUTPATIENT
Start: 2019-06-27 | End: 2019-06-27 | Stop reason: HOSPADM

## 2019-06-27 RX ORDER — DIPHENHYDRAMINE HYDROCHLORIDE 50 MG/ML
12.5 INJECTION, SOLUTION INTRAMUSCULAR; INTRAVENOUS AS NEEDED
Status: DISCONTINUED | OUTPATIENT
Start: 2019-06-27 | End: 2019-06-27 | Stop reason: HOSPADM

## 2019-06-27 RX ORDER — ONDANSETRON 2 MG/ML
INJECTION INTRAMUSCULAR; INTRAVENOUS
Status: COMPLETED
Start: 2019-06-27 | End: 2019-06-27

## 2019-06-27 RX ORDER — ROCURONIUM BROMIDE 10 MG/ML
INJECTION, SOLUTION INTRAVENOUS AS NEEDED
Status: DISCONTINUED | OUTPATIENT
Start: 2019-06-27 | End: 2019-06-27 | Stop reason: HOSPADM

## 2019-06-27 RX ORDER — FENTANYL CITRATE 50 UG/ML
25 INJECTION, SOLUTION INTRAMUSCULAR; INTRAVENOUS
Status: DISCONTINUED | OUTPATIENT
Start: 2019-06-27 | End: 2019-06-27 | Stop reason: HOSPADM

## 2019-06-27 RX ORDER — HEPARIN SODIUM 5000 [USP'U]/ML
5000 INJECTION, SOLUTION INTRAVENOUS; SUBCUTANEOUS
Status: COMPLETED | OUTPATIENT
Start: 2019-06-27 | End: 2019-06-27

## 2019-06-27 RX ORDER — HEPARIN SODIUM 5000 [USP'U]/ML
5000 INJECTION, SOLUTION INTRAVENOUS; SUBCUTANEOUS
Status: DISCONTINUED | OUTPATIENT
Start: 2019-06-27 | End: 2019-06-27 | Stop reason: SDUPTHER

## 2019-06-27 RX ORDER — OXYCODONE HYDROCHLORIDE 5 MG/1
TABLET ORAL
Status: COMPLETED
Start: 2019-06-27 | End: 2019-06-27

## 2019-06-27 RX ORDER — KETOROLAC TROMETHAMINE 30 MG/ML
INJECTION, SOLUTION INTRAMUSCULAR; INTRAVENOUS
Status: COMPLETED
Start: 2019-06-27 | End: 2019-06-27

## 2019-06-27 RX ORDER — SUCCINYLCHOLINE CHLORIDE 20 MG/ML
INJECTION INTRAMUSCULAR; INTRAVENOUS AS NEEDED
Status: DISCONTINUED | OUTPATIENT
Start: 2019-06-27 | End: 2019-06-27 | Stop reason: HOSPADM

## 2019-06-27 RX ORDER — ACETAMINOPHEN 10 MG/ML
INJECTION, SOLUTION INTRAVENOUS
Status: COMPLETED
Start: 2019-06-27 | End: 2019-06-27

## 2019-06-27 RX ORDER — KETOROLAC TROMETHAMINE 30 MG/ML
30 INJECTION, SOLUTION INTRAMUSCULAR; INTRAVENOUS
Status: COMPLETED | OUTPATIENT
Start: 2019-06-27 | End: 2019-06-27

## 2019-06-27 RX ORDER — OXYCODONE AND ACETAMINOPHEN 5; 325 MG/1; MG/1
1 TABLET ORAL
Qty: 40 TAB | Refills: 0 | Status: SHIPPED | OUTPATIENT
Start: 2019-06-27 | End: 2019-06-30

## 2019-06-27 RX ORDER — ACETAMINOPHEN 10 MG/ML
1000 INJECTION, SOLUTION INTRAVENOUS ONCE
Status: COMPLETED | OUTPATIENT
Start: 2019-06-27 | End: 2019-06-27

## 2019-06-27 RX ORDER — DEXAMETHASONE SODIUM PHOSPHATE 4 MG/ML
INJECTION, SOLUTION INTRA-ARTICULAR; INTRALESIONAL; INTRAMUSCULAR; INTRAVENOUS; SOFT TISSUE AS NEEDED
Status: DISCONTINUED | OUTPATIENT
Start: 2019-06-27 | End: 2019-06-27 | Stop reason: HOSPADM

## 2019-06-27 RX ORDER — MORPHINE SULFATE 10 MG/ML
INJECTION, SOLUTION INTRAMUSCULAR; INTRAVENOUS AS NEEDED
Status: DISCONTINUED | OUTPATIENT
Start: 2019-06-27 | End: 2019-06-27 | Stop reason: HOSPADM

## 2019-06-27 RX ORDER — LIDOCAINE HYDROCHLORIDE 10 MG/ML
0.1 INJECTION, SOLUTION EPIDURAL; INFILTRATION; INTRACAUDAL; PERINEURAL AS NEEDED
Status: DISCONTINUED | OUTPATIENT
Start: 2019-06-27 | End: 2019-06-27 | Stop reason: HOSPADM

## 2019-06-27 RX ADMIN — MORPHINE SULFATE 5 MG: 10 INJECTION, SOLUTION INTRAMUSCULAR; INTRAVENOUS at 09:16

## 2019-06-27 RX ADMIN — MORPHINE SULFATE 2 MG: 10 INJECTION, SOLUTION INTRAMUSCULAR; INTRAVENOUS at 10:50

## 2019-06-27 RX ADMIN — CLINDAMYCIN PHOSPHATE 900 MG: 18 INJECTION, SOLUTION INTRAVENOUS at 08:57

## 2019-06-27 RX ADMIN — PROPOFOL 250 MG: 10 INJECTION, EMULSION INTRAVENOUS at 08:53

## 2019-06-27 RX ADMIN — ROCURONIUM BROMIDE 20 MG: 10 INJECTION, SOLUTION INTRAVENOUS at 09:37

## 2019-06-27 RX ADMIN — MIDAZOLAM HYDROCHLORIDE 2 MG: 1 INJECTION, SOLUTION INTRAMUSCULAR; INTRAVENOUS at 08:43

## 2019-06-27 RX ADMIN — ACETAMINOPHEN 1000 MG: 10 INJECTION, SOLUTION INTRAVENOUS at 11:51

## 2019-06-27 RX ADMIN — ROCURONIUM BROMIDE 40 MG: 10 INJECTION, SOLUTION INTRAVENOUS at 09:00

## 2019-06-27 RX ADMIN — HEPARIN SODIUM 5000 UNITS: 5000 INJECTION INTRAVENOUS; SUBCUTANEOUS at 08:28

## 2019-06-27 RX ADMIN — KETOROLAC TROMETHAMINE 30 MG: 30 INJECTION, SOLUTION INTRAMUSCULAR; INTRAVENOUS at 11:51

## 2019-06-27 RX ADMIN — SODIUM CHLORIDE, SODIUM LACTATE, POTASSIUM CHLORIDE, AND CALCIUM CHLORIDE 25 ML/HR: 600; 310; 30; 20 INJECTION, SOLUTION INTRAVENOUS at 08:00

## 2019-06-27 RX ADMIN — ONDANSETRON 4 MG: 2 INJECTION INTRAMUSCULAR; INTRAVENOUS at 10:42

## 2019-06-27 RX ADMIN — FENTANYL CITRATE 100 MCG: 50 INJECTION, SOLUTION INTRAMUSCULAR; INTRAVENOUS at 08:52

## 2019-06-27 RX ADMIN — OXYCODONE HYDROCHLORIDE 5 MG: 5 TABLET ORAL at 13:28

## 2019-06-27 RX ADMIN — ONDANSETRON 4 MG: 2 INJECTION INTRAMUSCULAR; INTRAVENOUS at 14:13

## 2019-06-27 RX ADMIN — MORPHINE SULFATE 3 MG: 10 INJECTION, SOLUTION INTRAMUSCULAR; INTRAVENOUS at 10:32

## 2019-06-27 RX ADMIN — ROCURONIUM BROMIDE 10 MG: 10 INJECTION, SOLUTION INTRAVENOUS at 10:11

## 2019-06-27 RX ADMIN — KETOROLAC TROMETHAMINE 30 MG: 30 INJECTION, SOLUTION INTRAMUSCULAR at 11:51

## 2019-06-27 RX ADMIN — DEXAMETHASONE SODIUM PHOSPHATE 8 MG: 4 INJECTION, SOLUTION INTRA-ARTICULAR; INTRALESIONAL; INTRAMUSCULAR; INTRAVENOUS; SOFT TISSUE at 09:03

## 2019-06-27 RX ADMIN — SUCCINYLCHOLINE CHLORIDE 180 MG: 20 INJECTION INTRAMUSCULAR; INTRAVENOUS at 08:53

## 2019-06-27 RX ADMIN — LIDOCAINE HYDROCHLORIDE 80 MG: 20 INJECTION, SOLUTION EPIDURAL; INFILTRATION; INTRACAUDAL; PERINEURAL at 08:53

## 2019-06-27 RX ADMIN — Medication 80 MCG: at 09:45

## 2019-06-27 RX ADMIN — ROCURONIUM BROMIDE 10 MG: 10 INJECTION, SOLUTION INTRAVENOUS at 10:29

## 2019-06-27 RX ADMIN — ROCURONIUM BROMIDE 10 MG: 10 INJECTION, SOLUTION INTRAVENOUS at 08:53

## 2019-06-27 RX ADMIN — Medication 80 MCG: at 09:11

## 2019-06-27 RX ADMIN — Medication 80 MCG: at 09:38

## 2019-06-27 NOTE — PERIOP NOTES
1220- Report received from [address] [place] [address] for lunch relief. 1230- Family updated.

## 2019-06-27 NOTE — H&P
Surgery H&P     Subjective:   Patient 34 y.o.  female s/p laparoscopic converted to open cholecystectomy with intraoperative cholangiogram, CBD exploration with removal of common bile duct stones, and T-tube (14 Fr) placement on 1/18/18 presents with RUQ bulge since the summer of 2018. It's increasing in size but denies any pain. No obstructive symptoms. No nausea or vomiting. No F/C/S. No change in bowel habits. No diarrhea or constipation. Patient underwent soft tissue US on 12/31/18 and it showed large anterior abdominal wall hernia. Patient presents today for surgical consultation.   Patient is a  and wants to see if she can wait to have surgery in the summer when the school is out.           Past Medical & Surgical History:       Past Medical History:   Diagnosis Date    Asthma      Hypertension      PVC (premature ventricular contraction)              Past Surgical History:   Procedure Laterality Date    HX OTHER SURGICAL         Vaginal delivery    HX OTHER SURGICAL   2018     GALLBLADDER SURGERY    HX TONSIL AND ADENOIDECTOMY             Social History:  Social History            Socioeconomic History    Marital status:        Spouse name: Not on file    Number of children: Not on file    Years of education: Not on file    Highest education level: Not on file   Social Needs    Financial resource strain: Not on file    Food insecurity - worry: Not on file    Food insecurity - inability: Not on file   [employer] needs - medical: Not on file   [employer] needs - non-medical: Not on file   Occupational History    Not on file   Tobacco Use    Smoking status: Former Smoker    Smokeless tobacco: Never Used   Substance and Sexual Activity    Alcohol use: No    Drug use: No    Sexual activity: Yes       Partners: Male       Birth control/protection: Pill   Other Topics Concern    Not on file   Social History Narrative    Not on file         Family History:        Family History   Problem Relation Age of Onset    Hypertension Mother      Hypertension Father      Cancer Father      Cancer Maternal Grandfather      Heart Disease Paternal Grandmother           Medications:       Current Outpatient Medications   Medication Sig    losartan (COZAAR) 50 mg tablet TK 1 T PO QD    FLUoxetine (PROZAC) 40 mg capsule Take 60 mg by mouth nightly.  famotidine (PEPCID) 20 mg tablet Take 20 mg by mouth as needed.  ibuprofen (MOTRIN) 800 mg tablet Take 1 Tab by mouth every eight (8) hours. (Patient taking differently: Take 400 mg by mouth as needed.)    loratadine (CLARITIN) 10 mg tablet Take 5 mg by mouth as needed.  FLUoxetine (PROZAC) 20 mg capsule TK 3 CS PO QD    traMADol (ULTRAM) 50 mg tablet Take 1-2 Tabs by mouth every six (6) hours as needed for Pain. Max Daily Amount: 400 mg.  LORazepam (ATIVAN) 0.5 mg tablet Take 0.5 mg by mouth as needed.  promethazine (PHENERGAN) 12.5 mg tablet Take 1 Tab by mouth every six (6) hours as needed for Nausea.      No current facility-administered medications for this visit.          Allergies: Allergies   Allergen Reactions    Biaxin [Clarithromycin] Nausea Only    Cephalosporins Rash    Dilaudid [Hydromorphone] Shortness of Breath       Extremely lethargic, Flushed    Levaquin [Levofloxacin] Rash    Penicillin G Rash         Review of Systems  A comprehensive review of systems was negative except for that written in the HPI.     Objective:      Exam:     Visit Vitals  /77 (BP 1 Location: Right arm, BP Patient Position: Sitting)   Pulse 83   Temp 99.2 °F (37.3 °C)   Ht 5' 7\" (1.702 m)   Wt 148.2 kg (326 lb 12.8 oz)   SpO2 97%   BMI 51.18 kg/m²      General appearance: alert, cooperative, no distress, appears stated age  Eyes: negative  Lungs: clear to auscultation bilaterally  Heart: regular rate and rhythm  Abdomen: soft, non-tender. Non-distended. Well healed RUQ incision with bulge. Difficult to appreciate the size of the fascial defect. Extremities: extremities normal, atraumatic, no cyanosis or edema. JORGE. Skin: Skin color, texture, turgor normal. No rashes or lesions. Neurologic: Grossly normal     Assessment:      RUQ incisional hernia     Plan:      Davinci incisional hernia repair +/- component separation based on CT scan. Risks, benefit, and alternative to surgery was discussed with the patient. The risks of surgery include but not limited to infection, bleeding, intraabdominal organ injury, recurrence, possible conversion to open, and the risks of general anesthetic. Patient is agreeable to surgery but wants to hold off on surgery until the summer when school is out.   All questions answered.

## 2019-06-27 NOTE — ANESTHESIA POSTPROCEDURE EVALUATION
Procedure(s):  DAVINCI INCARCERATED INCISIONAL HERNIA REPAIR. general    Anesthesia Post Evaluation        Patient location during evaluation: PACU  Note status: Adequate. Level of consciousness: responsive to verbal stimuli and sleepy but conscious  Pain management: satisfactory to patient  Airway patency: patent  Anesthetic complications: no  Cardiovascular status: acceptable  Respiratory status: acceptable  Hydration status: acceptable  Comments: +Post-Anesthesia Evaluation and Assessment    Patient: Marko Llanos MRN: [id number]  SSN: [id number]   YOB: 1980  Age: 37 y.o. Sex: female      Cardiovascular Function/Vital Signs    /73   Pulse 73   Temp 37.1 °C (98.7 °F)   Resp 21   Ht 5' 7\" (1.702 m)   Wt 148.3 kg (326 lb 15.1 oz)   SpO2 92%   BMI 51.21 kg/m²     Patient is status post Procedure(s):  DAVINCI INCARCERATED INCISIONAL HERNIA REPAIR. Nausea/Vomiting: Controlled. Postoperative hydration reviewed and adequate. Pain:  Pain Scale 1: FLACC (06/27/19 1203)  Pain Intensity 1: 0 (06/27/19 1203)   Managed. Neurological Status:   Neuro (WDL): Exceptions to WDL (06/27/19 1104)   At baseline. Mental Status and Level of Consciousness: Arousable. Pulmonary Status:   O2 Device: Room air (06/27/19 1220)   Adequate oxygenation and airway patent. Complications related to anesthesia: None    Post-anesthesia assessment completed. No concerns. Signed By: Alyssa Harris MD    6/27/2019  Post anesthesia nausea and vomiting:  controlled      Vitals Value Taken Time   /73 6/27/2019 12:20 PM   Temp 37.1 °C (98.7 °F) 6/27/2019 11:07 AM   Pulse 85 6/27/2019 12:28 PM   Resp 13 6/27/2019 12:28 PM   SpO2 92 % 6/27/2019 12:28 PM   Vitals shown include unvalidated device data.

## 2019-06-27 NOTE — DISCHARGE INSTRUCTIONS
Patient Discharge Instructions    Keiko Vance / [id number] : 1980    Admitted 2019 Discharged: 2019         What to do at Home    Recommended diet: Regular Diet    Recommended activity: no heavy lifting > 20 lbs x 6 weeks; no driving while taking narcotics for pain. May take shower, but no bath x 2 weeks. Keep ice over the incision to minimize swelling. Please wear abdominal binder when ambulating. Please take over the counter stool softener or miralax while taking narcotics for pain. If you experience a lot of drainage, develop redness around the wound, or a fever over 101 F occurs please call the office. Narcotics and anesthesia sometimes cause nausea and vomiting. If persistent please call the office. Do not hesitate to call with questions or concerns. Follow-up with Dr. Meredith Manjarrez in 2 weeks. Please call [phone] for an appointment. Information obtained by :  I understand that if any problems occur once I am at home I am to contact my physician. I understand and acknowledge receipt of the instructions indicated above.                                                                                                                                                Physician's or R.N.'s Signature                                                                  Date/Time                                                                                                                                              Patient or Representative Signature                                                          Date/Time        DISCHARGE SUMMARY from Nurse    PATIENT INSTRUCTIONS:    After general anesthesia or intravenous sedation, for 24 hours or while taking prescription Narcotics:  · Limit your activities  · Do not drive and operate hazardous machinery  · Do not make important personal or business decisions  · Do  not drink alcoholic beverages  · If you have not urinated within 8 hours after discharge, please contact your surgeon on call. Report the following to your surgeon:  · Excessive pain, swelling, redness or odor of or around the surgical area  · Temperature over 100.5  · Nausea and vomiting lasting longer than 4 hours or if unable to take medications  · Any signs of decreased circulation or nerve impairment to extremity: change in color, persistent  numbness, tingling, coldness or increase pain  · Any questions    What to do at Home:  A common side effect of anesthesia following surgery is nausea and/or vomiting. In order to decrease symptoms, it is wise to avoid foods that are high in fat, greasy foods, milk products, and spicy foods for the first 24 hours. Acceptable foods for the first 24 hours following surgery include but are not limited to:     soup   broth    toast    crackers    applesauce    bananas    mashed potatoes,   soft or scrambled eggs   oatmeal    jello    It is important to eat when taking your pain medication. This will help to prevent nausea. If possible, please try to time your meals with your medications. It is very important to stay hydrated following surgery. Sip fluids frequently while awake. Avoid acidic drinks such as citrus juices and soda for 24 hours. Carbonated beverages may cause bloating and gas. Acceptable fluids include:    - water (flavor packets may add variety)  - coffee or tea (in moderation)  - Gatorade  - Gómez-aid  - apple juice  - cranberry juice    You are encouraged to cough and deep breathe every hour when awake. This will help to prevent respiratory complications following anesthesia. You may want to hug a pillow when coughing and sneezing to add additional support to the surgical area and to decrease discomfort if you had abdominal or chest surgery. If you are discharged home with support stockings, you may remove them after 24 hours.  Support stockings are used to help prevent blood clots in the legs following surgery. Please take time to review all of your Home Care Instructions and Medication Information sheets provided in your discharge packet. If you have any questions, please contact your surgeons office. Thank you. How to Care for Your Wound After Its Treated With  DERMABOND* Topical Skin Adhesive  DERMABOND* Topical Skin Adhesive (2-octyl cyanoacrylate) is a sterile, liquid skin adhesive  that holds wound edges together. The film will usually remain in place for 5 to 10 days, then  naturally fall off your skin. The following will answer some of your questions and provide instructions for proper care for your  wound while it is healing:    CHECK WOUND APPEARANCE   Some swelling, redness, and pain are common with all wounds and normally will go away as the  wound heals. If swelling, redness, or pain increases or if the wound feels warm to the touch,  contact a doctor. Also contact a doctor if the wound edges reopen or separate. REPLACE BANDAGES   If your wound is bandaged, keep the bandage dry.  Replace the dressing daily until the adhesive film has fallen off or if the  bandage should become wet, unless otherwise instructed by your  physician.  When changing the dressing, do not place tape directly over the  DERMABOND adhesive film, because removing the tape later may also  remove the film. AVOID TOPICAL MEDICATIONS   Do not apply liquid or ointment medications or any other product to your wound while the  DERMABOND adhesive film is in place. These may loosen the film before your wound is healed. KEEP WOUND DRY AND PROTECTED   You may occasionally and briefly wet your wound in the shower or bath. Do not soak or scrub  your wound, do not swim, and avoid periods of heavy perspiration until the DERMABOND  adhesive has naturally fallen off. After showering or bathing, gently blot your wound dry with a  soft towel.  If a protective dressing is being used, apply a fresh, dry bandage, being sure to keep  the tape off the DERMABOND adhesive film.  Apply a clean, dry bandage over the wound if necessary to protect it.  Protect your wound from injury until the skin has had sufficient time to heal.   Do not scratch, rub, or pick at the DERMABOND adhesive film. This may loosen the film before  your wound is healed.  Protect the wound from prolonged exposure to sunlight or tanning lamps while the film is in  place. If you have any questions or concerns about this product, please consult your doctor. *[employer]ademark ©[employer] 8060TD PREVENT AN INFECTION      1. [address] YOUR HANDS     To prevent infection, good handwashing is the most important thing you or your caregiver can do.  Wash your hands with soap and water or use the hand  we gave you before you touch any wounds. 2. SHOWER     Use the antibacterial soap we gave you when you take a shower.  Shower with this soap until your wounds are healed.  To reach all areas of your body, you may need someone to help you.  Dont forget to clean your belly button with every shower. 3.  USE CLEAN SHEETS     Use freshly cleaned sheets on your bed after surgery.  To keep the surgery site clean, do not allow pets to sleep with you while your wound is still healing. 4. STOP SMOKING     Stop smoking, or at least cut back on smoking     Smoking slows your healing. 5.  CONTROL YOUR BLOOD SUGAR     High blood sugars slow wound healing. If you are diabetic, control your blood sugar levels before and after your surgery. Patient Education      Narcotic-Analgesic/Acetaminophen (Percocet, [facility] , Lortab 10/325) - (By mouth)   Why this medicine is used:   Relieves pain.   Contact a nurse or doctor right away if you have:  · Extreme weakness, shallow breathing, slow heartbeat  · Severe confusion, lightheadedness, dizziness, fainting  · Yellow skin or eyes, dark urine or pale stools  · Severe constipation, severe stomach pain, nausea, vomiting, loss of appetite  · Sweating or cold, clammy skin     Common side effects:  · Mild constipation, nausea, vomiting  · Sleepiness, tiredness  · Itching, rash  © 2017 [facility] Information is for End User's use only and may not be sold, redistributed or otherwise used for commercial purposes. No smoking/ No tobacco products/ Avoid exposure to second hand smoke  Surgeon General's Warning:  Quitting smoking now greatly reduces serious risk to your health. Obesity, smoking, and sedentary lifestyle greatly increases your risk for illness    A healthy diet, regular physical exercise & weight monitoring are important for maintaining a healthy lifestyle    You may be retaining fluid if you have a history of heart failure or if you experience any of the following symptoms:  Weight gain of 3 pounds or more overnight or 5 pounds in a week, increased swelling in our hands or feet or shortness of breath while lying flat in bed. Please call your doctor as soon as you notice any of these symptoms; do not wait until your next office visit. The discharge information has been reviewed with the {PATIENT PARENT GUARDIAN:60006}. The {PATIENT PARENT GUARDIAN:22788} verbalized understanding. Discharge medications reviewed with the {[facility]carge meds reviewed [facility]:84469} and appropriate educational materials and side effects teaching were provided.   ___________________________________________________________________________________________________________________________________

## 2019-06-27 NOTE — ROUTINE PROCESS
Patient: Kota Ordonez MRN: [id number]  SSN: [id number] YOB: 1980  Age: 37 y.o. Sex: female Patient is status post Procedure(s): 
[place]. Surgeon(s) and Role: 
   María Elena Flores MD - Primary Local/Dose/Irrigation:  7 ML of 0.25% marcaine with Epi. Peripheral IV 06/27/19 Left Hand (Active) Site Assessment Clean, dry, & intact 6/27/2019  8:07 AM  
Phlebitis Assessment 0 6/27/2019  8:07 AM  
Infiltration Assessment 0 6/27/2019  8:07 AM  
Dressing Status Clean, dry, & intact 6/27/2019  8:07 AM  
Dressing Type Tape;Transparent 6/27/2019  8:07 AM  
Hub Color/Line Status Pink; Infusing 6/27/2019  8:07 AM  
        
Airway - Endotracheal Tube 06/27/19 Oral (Active) Dressing/Packing:  Wound Abdomen 3 Trocar sites with sutures and Exofin. -Dressing Type: Sutures; Topical skin adhesive/glue(Exofin) (06/27/19 0957) Splint/Cast:  ] Other:

## 2019-07-10 ENCOUNTER — OFFICE VISIT (OUTPATIENT)
Dept: SURGERY | Age: 39
End: 2019-07-10

## 2019-07-10 VITALS
WEIGHT: 293 LBS | RESPIRATION RATE: 20 BRPM | SYSTOLIC BLOOD PRESSURE: 132 MMHG | BODY MASS INDEX: 45.99 KG/M2 | HEIGHT: 67 IN | HEART RATE: 96 BPM | OXYGEN SATURATION: 98 % | DIASTOLIC BLOOD PRESSURE: 90 MMHG

## 2019-07-10 DIAGNOSIS — Z09 POSTOPERATIVE EXAMINATION: Primary | ICD-10-CM

## 2019-07-10 NOTE — PROGRESS NOTES
Patient is here for follow-up. Patient underwent Marny Guard incarcerated incisional hernia repair on 6/27/19. Doing well. No complaints. PE:  Visit Vitals  /90 (BP 1 Location: Right arm, BP Patient Position: Sitting)   Pulse 96   Resp 20   Ht 5' 7\" (1.702 m)   Wt 148.8 kg (328 lb)   SpO2 98%   BMI 51.37 kg/m²     Abdomen:  Soft, ND. Inc C/D/I. Minimal rim of erythema middle trocar site; most likely irritation from abdominal binder. No drainage. Neosporin applied.     Assessment:  S/p Marny Guard incarcerated incisional hernia repair    Plan:  -Neosporin  -Instructed to RTC if erythema worsens

## 2019-07-10 NOTE — PROGRESS NOTES
Chief Complaint   Patient presents with    Surgical Follow-up     Aris Barnard incarcerated incisional hernia repair 06/27/19     1. Have you been to the ER, urgent care clinic since your last visit? Hospitalized since your last visit? No    2. Have you seen or consulted any other health care providers outside of the [address] [place] [address] since your last visit? Include any pap smears or colon screening.  No

## (undated) DEVICE — SCISSORS SURG DIA8MM MPLR CRV ENDOWRIST

## (undated) DEVICE — VISUALIZATION SYSTEM: Brand: CLEARIFY

## (undated) DEVICE — KENDALL SCD EXPRESS SLEEVES, KNEE LENGTH, MEDIUM: Brand: KENDALL SCD

## (undated) DEVICE — CATHETER URET 4FR L70CM POLYUR OLV FLX TIP KINK RESIST W/

## (undated) DEVICE — SPONGE HEMOSTAT CELLULS 4X8IN -- SURGICEL

## (undated) DEVICE — 1200 GUARD II KIT W/5MM TUBE W/O VAC TUBE: Brand: GUARDIAN

## (undated) DEVICE — (D)PREP SKN CHLRAPRP APPL 26ML -- CONVERT TO ITEM 371833

## (undated) DEVICE — BAG SPEC RETRV 275ML 10ML DISPOSABLE RELIACATCH

## (undated) DEVICE — DEVON™ KNEE AND BODY STRAP 60" X 3" (1.5 M X 7.6 CM): Brand: DEVON

## (undated) DEVICE — INFECTION CONTROL KIT SYS

## (undated) DEVICE — Device: Brand: ENDO SMARTCAP

## (undated) DEVICE — ERCP CANNULA-TAPERED TIP: Brand: FLUORO TIP ERCP CANNULA

## (undated) DEVICE — LARGE NEEDLE DRIVER: Brand: ENDOWRIST

## (undated) DEVICE — MAGNETIC IMPLANT S1000-00: Brand: SOPHONO™

## (undated) DEVICE — STERILE POLYISOPRENE POWDER-FREE SURGICAL GLOVES: Brand: PROTEXIS

## (undated) DEVICE — PICO SINGLE USE NEGATIVE PRESSURE WOUND THERAPY SYSTEM 15CM X 30CM  6IN. X 12IN.: Brand: PICO

## (undated) DEVICE — BAG DRAIN BILE TUBE T 19OZ --

## (undated) DEVICE — SUTURE ABSORBABLE MONOFILAMENT 2-0 WND CLOSURE GRN V-LOC 180 VLOCL0315

## (undated) DEVICE — Device

## (undated) DEVICE — SYRINGE MED 20ML STD CLR PLAS LUERLOCK TIP N CTRL DISP

## (undated) DEVICE — SUTURE VCRL SZ 0 L36IN ABSRB VLT L36MM CT-1 1/2 CIR J346H

## (undated) DEVICE — STERILE POLYISOPRENE POWDER-FREE SURGICAL GLOVES WITH EMOLLIENT COATING: Brand: PROTEXIS

## (undated) DEVICE — APPLICATOR BNDG 1MM ADH PREMIERPRO EXOFIN

## (undated) DEVICE — COVER,MAYO STAND,STERILE: Brand: MEDLINE

## (undated) DEVICE — MEGA NEEDLE DRIVER: Brand: ENDOWRIST

## (undated) DEVICE — FALCON® SAMPLE CONTAINER, WITH LID, 4.5OZ (110ML), INDIVIDUALLY WRAPPED, STERILE, 100/CASE: Brand: FALCON A CORNING BRAND

## (undated) DEVICE — SOLUTION IV 500ML 0.9% SOD CHL FLX CONT

## (undated) DEVICE — TOWEL SURG W17XL27IN STD BLU COT NONFENESTRATED PREWASHED

## (undated) DEVICE — NEEDLE HYPO 22GA L1.5IN BLK S STL HUB POLYPR SHLD REG BVL

## (undated) DEVICE — NEEDLE HYPO 25GA L1.5IN BVL ORIENTED ECLIPSE

## (undated) DEVICE — SUTURE MCRYL SZ 4-0 L27IN ABSRB UD L19MM PS-2 1/2 CIR PRIM Y426H

## (undated) DEVICE — SUTURE SZ 0 27IN 5/8 CIR UR-6  TAPER PT VIOLET ABSRB VICRYL J603H

## (undated) DEVICE — SUTURE PDS II SZ 1 L36IN ABSRB VLT L48MM CTX 1/2 CIR Z371T

## (undated) DEVICE — SPHINCTEROTOME: Brand: DREAMTOME™ RX 44

## (undated) DEVICE — CLIP APPLIER WITH CLIP LOGIC TECHNOLOGY: Brand: ENDO CLIP III

## (undated) DEVICE — BINDER ABD M/L H12IN FOR 46-62IN WHT 4 SLD PNL DSGN HOOP

## (undated) DEVICE — CADIERE FORCEPS: Brand: ENDOWRIST

## (undated) DEVICE — Z DISCONTINUED NO SUB IDED DRAIN SURG 14FR XBAR L12IN STEM L11IN GRAV W/ CATTELL T TB

## (undated) DEVICE — DERMABOND SKIN ADH 0.7ML -- DERMABOND ADVANCED 12/BX

## (undated) DEVICE — SYR 10ML LUER LOK 1/5ML GRAD --

## (undated) DEVICE — SOLUTION IRRIG 1000ML H2O STRL BLT

## (undated) DEVICE — ENDOLOOP SUT LIGATURE 18IN -- 12/BX PDS II

## (undated) DEVICE — DRAPE XR C ARM 41X74IN LF --

## (undated) DEVICE — REM POLYHESIVE ADULT PATIENT RETURN ELECTRODE: Brand: VALLEYLAB

## (undated) DEVICE — TROCAR SITE CLOSURE DEVICE: Brand: ENDO CLOSE

## (undated) DEVICE — SUTURE PDS II SZ 1 L96IN ABSRB VLT XLH L70MM 1/2 CIR Z881G

## (undated) DEVICE — STRAP,POSITIONING,KNEE/BODY,FOAM,4X60": Brand: MEDLINE

## (undated) DEVICE — UNIVERSAL FIXATION CANNULA: Brand: VERSAONE

## (undated) DEVICE — SURGICAL PROCEDURE KIT GEN LAPAROSCOPY LF

## (undated) DEVICE — RADIFOCUS GLIDEWIRE: Brand: GLIDEWIRE

## (undated) DEVICE — GUIDEWIRE ENDOSCP WRK L450CM DIA0.035IN STD SHFT STR RND

## (undated) DEVICE — SUTURE VCRL SZ 4-0 L27IN ABSRB UD L19MM PS-2 3/8 CIR PRIM J426H

## (undated) DEVICE — 3M™ TEGADERM™ TRANSPARENT FILM DRESSING FRAME STYLE, 1626W, 4 IN X 4-3/4 IN (10 CM X 12 CM), 50/CT 4CT/CASE: Brand: 3M™ TEGADERM™

## (undated) DEVICE — SLIM BODY SKIN STAPLER: Brand: APPOSE ULC

## (undated) DEVICE — FOGARTY BILIARY BALLOON PROBE 5F 23CM: Brand: FOGARTY

## (undated) DEVICE — ELECTRODE ES 36CM LAP FLAT L HK COAT DISP CLEANCOAT

## (undated) DEVICE — SEAL UNIV 5-8MM DISP BX/10 -- DA VINCI XI - SNGL USE

## (undated) DEVICE — SUTURE V LOC 1 L18IN NONABSORBABLE GS-21 TAPERPOINT NDL VLOCN0327

## (undated) DEVICE — BITE BLK ENDOSCP AD 54FR GRN POLYETH ENDOSCP W STRP SLD

## (undated) DEVICE — DILATOR AND CANNULA WITH RADIALLY EXPANDABLE SLEEVE: Brand: VERSASTEP PLUS

## (undated) DEVICE — SUTURE VCRL SZ 3-0 L27IN ABSRB VLT L36MM CT-1 1/2 CIR J338H

## (undated) DEVICE — RETRIEVAL BALLOON CATHETER: Brand: EXTRACTOR™ PRO RX

## (undated) DEVICE — BLADELESS OPTICAL TROCAR WITH FIXATION CANNULA: Brand: VERSAPORT

## (undated) DEVICE — HANDLE LT SNAP ON ULT DURABLE LENS FOR TRUMPF ALC DISPOSABLE

## (undated) DEVICE — TIP COVER ACCESSORY

## (undated) DEVICE — DRAIN SURG 19FR SIL RND HUBLESS W/ 0.25IN BEND TRCR BLAK

## (undated) DEVICE — STOPCOCK IV 4 W TRNSPAR

## (undated) DEVICE — BLADELESS OPTICAL TROCAR WITH FIXATION CANNULA: Brand: VERSAONE

## (undated) DEVICE — NDL PRT INJ NSAF BLNT 18GX1.5 --

## (undated) DEVICE — ARM DRAPE